# Patient Record
Sex: MALE | Race: WHITE | NOT HISPANIC OR LATINO | Employment: FULL TIME | URBAN - METROPOLITAN AREA
[De-identification: names, ages, dates, MRNs, and addresses within clinical notes are randomized per-mention and may not be internally consistent; named-entity substitution may affect disease eponyms.]

---

## 2020-09-09 DIAGNOSIS — R22.1 LOCALIZED SWELLING, MASS OR LUMP OF NECK: Primary | ICD-10-CM

## 2020-09-17 ENCOUNTER — HOSPITAL ENCOUNTER (OUTPATIENT)
Dept: ULTRASOUND IMAGING | Age: 25
Discharge: HOME | End: 2020-09-17

## 2020-09-17 DIAGNOSIS — R22.1 LOCALIZED SWELLING, MASS OR LUMP OF NECK: ICD-10-CM

## 2020-09-17 PROCEDURE — 76536 US EXAM OF HEAD AND NECK: CPT

## 2020-10-12 ENCOUNTER — HOSPITAL ENCOUNTER (OUTPATIENT)
Dept: CARDIOLOGY | Age: 25
Discharge: HOME | End: 2020-10-12

## 2020-10-12 ENCOUNTER — HOSPITAL ENCOUNTER (OUTPATIENT)
Dept: CARDIOLOGY | Age: 25
Discharge: HOME | End: 2020-10-12
Attending: FAMILY MEDICINE

## 2020-10-12 DIAGNOSIS — R07.9 RIGHT-SIDED CHEST PAIN: ICD-10-CM

## 2020-10-12 DIAGNOSIS — R94.31 ABNORMAL ELECTROCARDIOGRAM (ECG) (EKG): ICD-10-CM

## 2020-10-12 DIAGNOSIS — R94.31 ABNORMAL ELECTROCARDIOGRAM (ECG) (EKG): Primary | ICD-10-CM

## 2020-10-12 LAB
ATRIAL RATE (BPM): 63
P AXIS (DEGREES): 64
PR-INTERVAL (MSEC): 152
QRS-INTERVAL (MSEC): 94
QT-INTERVAL (MSEC): 388
QTC: 397
R AXIS (DEGREES): 84
REPORT TEXT: NORMAL
T AXIS (DEGREES): 14
VENTRICULAR RATE EKG/MIN (BPM): 63

## 2020-10-12 PROCEDURE — 93005 ELECTROCARDIOGRAM TRACING: CPT | Performed by: FAMILY MEDICINE

## 2021-03-27 ENCOUNTER — HOSPITAL ENCOUNTER (OUTPATIENT)
Dept: GENERAL RADIOLOGY | Age: 26
Discharge: HOME | End: 2021-03-27
Attending: PHYSICIAN ASSISTANT

## 2021-03-27 DIAGNOSIS — R22.9 LUMP OF SKIN: ICD-10-CM

## 2021-03-27 PROCEDURE — 71046 X-RAY EXAM CHEST 2 VIEWS: CPT

## 2022-01-20 ENCOUNTER — HOSPITAL ENCOUNTER (EMERGENCY)
Age: 27
Discharge: LEFT BEFORE DISCHARGE | End: 2022-01-20

## 2022-01-20 VITALS
HEART RATE: 88 BPM | TEMPERATURE: 98.2 F | DIASTOLIC BLOOD PRESSURE: 80 MMHG | OXYGEN SATURATION: 97 % | SYSTOLIC BLOOD PRESSURE: 136 MMHG | RESPIRATION RATE: 18 BRPM

## 2022-03-21 DIAGNOSIS — R22.9 LOCALIZED SUPERFICIAL SWELLING, MASS, OR LUMP: Primary | ICD-10-CM

## 2022-03-25 ENCOUNTER — APPOINTMENT (OUTPATIENT)
Dept: MRI IMAGING | Age: 27
End: 2022-03-25
Attending: PHYSICIAN ASSISTANT

## 2023-04-18 ENCOUNTER — TELEPHONE (OUTPATIENT)
Dept: FAMILY MEDICINE | Age: 28
End: 2023-04-18

## 2024-03-19 ENCOUNTER — OFFICE VISIT (OUTPATIENT)
Dept: INTERNAL MEDICINE | Facility: CLINIC | Age: 29
End: 2024-03-19
Payer: COMMERCIAL

## 2024-03-19 VITALS
WEIGHT: 271.8 LBS | DIASTOLIC BLOOD PRESSURE: 80 MMHG | HEART RATE: 68 BPM | BODY MASS INDEX: 38.05 KG/M2 | HEIGHT: 71 IN | OXYGEN SATURATION: 97 % | SYSTOLIC BLOOD PRESSURE: 125 MMHG

## 2024-03-19 DIAGNOSIS — F51.01 PRIMARY INSOMNIA: ICD-10-CM

## 2024-03-19 DIAGNOSIS — E55.9 VITAMIN D DEFICIENCY: ICD-10-CM

## 2024-03-19 DIAGNOSIS — F41.1 GAD (GENERALIZED ANXIETY DISORDER): ICD-10-CM

## 2024-03-19 DIAGNOSIS — Z00.00 ENCOUNTER FOR PREVENTIVE CARE: ICD-10-CM

## 2024-03-19 DIAGNOSIS — Z11.59 NEED FOR HEPATITIS C SCREENING TEST: ICD-10-CM

## 2024-03-19 DIAGNOSIS — Z11.4 SCREENING FOR HIV (HUMAN IMMUNODEFICIENCY VIRUS): Primary | ICD-10-CM

## 2024-03-19 PROCEDURE — 99214 OFFICE O/P EST MOD 30 MIN: CPT | Performed by: NURSE PRACTITIONER

## 2024-03-19 ASSESSMENT — ANXIETY QUESTIONNAIRES
1. FEELING NERVOUS, ANXIOUS, OR ON EDGE: SEVERAL DAYS
GAD7 TOTAL SCORE: 4
2. NOT BEING ABLE TO STOP OR CONTROL WORRYING: SEVERAL DAYS
6. BECOMING EASILY ANNOYED OR IRRITABLE: NOT AT ALL
3. WORRYING TOO MUCH ABOUT DIFFERENT THINGS: SEVERAL DAYS
GAD7 TOTAL SCORE: 4
IF YOU CHECKED OFF ANY PROBLEMS ON THIS QUESTIONNAIRE, HOW DIFFICULT HAVE THESE PROBLEMS MADE IT FOR YOU TO DO YOUR WORK, TAKE CARE OF THINGS AT HOME, OR GET ALONG WITH OTHER PEOPLE: NOT DIFFICULT AT ALL
7. FEELING AFRAID AS IF SOMETHING AWFUL MIGHT HAPPEN: NOT AT ALL
5. BEING SO RESTLESS THAT IT IS HARD TO SIT STILL: NOT AT ALL

## 2024-03-19 ASSESSMENT — PATIENT HEALTH QUESTIONNAIRE - PHQ9
SUM OF ALL RESPONSES TO PHQ QUESTIONS 1-9: 7
5. POOR APPETITE OR OVEREATING: SEVERAL DAYS

## 2024-03-19 NOTE — PATIENT INSTRUCTIONS
I usually comment on labs and imaging after they are all resulted within 2 business days. If you haven't heard your results within a week, please contact the office.    Thank you for coming in today!    If you receive a survey from [employer] about your experience today, it would be very helpful if you could fill it out to let us know what went well and what we can improve!    General Information:    Today you had your appointment with Carol Wheatley NP    My hours are:  Tuesdays : 2:00PM - 7:00 PM    I am not in the office the other weekdays. Non-urgent calls and medical messages on days I'm not in clinic are typically addressed twice weekly. Urgent matters will be reviewed and addressed by one of my partners in the office as needed.    If lab work was done today as part of your evaluation you will generally be contacted via [email or web address], mail, or phone with the results within 1-5 days. If there is an alarming result we will contact you by phone. Lab results come back at varying times. I generally wait until all lab results are available before making comments on the results.     If you are awaiting scheduling for a referral, please be sure to answer calls from [facility] or call the scheduling number.      If you need refills please contact your pharmacy. They will send a refill request to me to review. Please allow 3-5 business days for us to process all refill requests.     My Clinic RN is Ronda. Please call us at [phone] or send a medical message with any questions or concerns.     Sincerely, Carol Wheatley NP

## 2024-03-19 NOTE — PROGRESS NOTES
Assessment and Plan   Mr. Gray is a 28 year old male with history of anxiety who presents to establish care and to restart anxiety medication.      (Z00.00) Encounter for preventive care  (Z11.4) Screening for HIV (human immunodeficiency virus)  (primary encounter diagnosis)  (Z11.59) Need for hepatitis C screening test  Plan: REVIEW OF HEALTH MAINTENANCE PROTOCOL ORDERS,   HIV Screening,   Hepatitis C Screen Reflex to HCV RNA Quant and Genotype  Vitamin D Deficiency,   CBC with platelets and differential,   Comprehensive metabolic panel (BMP + Alb, Alk Phos, ALT, AST,       Total. Bili, TP),   Lipid panel reflex to direct LDL Fasting          (F41.1) QIAN (generalized anxiety disorder)  (F51.01) Primary insomnia  Comment: recurrent  Plan: FLUoxetine (PROZAC) 20 MG capsule,   Adult Mental Health  Referral,   TSH with free T4 reflex  Trial of melatonin 5 mg 1 hour before bed  Continue good lifestyle health practices of exercise, limited caffiene and alcohol use; Work to improve diet with fewer fast foods.       Health Maintenance:  Immunizations (covid, influenza, pneumovax, rsv, zoster, Tdap, Hep A/B):   Most Recent Immunizations   Administered Date(s) Administered    COVID-19 12+ (2023-24) (MODERNA) 11/14/2023     HTN:  Normotensive  Lipids:  Pending    Return to clinic/Follow-up:  As needed and with no improvement, worsening, or new symptom development.     __________________________    Subjective   Presenting Concern:  Establish care, Anxiety medication.      Recently moved back to the [place] after having been in [place] for the last several years.  No records available through [facility].     Health concerns include:    FH colorectal CA (father, age 60); Sister with noncancerous polyp age 24.    Anxiety, panic attacks, previously on Fluoxetine 20 mg daily. Started on 2020, took through 2023. Subsequent panic attack in February 2024 - tachycardia, some insomnia.    Open to counseling. No personal history of  trauma.   No suicidal/self harm thoughts; No guns in the home  Anxiety triggers include work, finances  Endorses good support through his girlfriend and his family    Cyst - Posterior right shoulder  Possible lipoma    Occ palpitations  Acid reflux    Past Medical History:  No past medical history on file.    Active Meds:  No current outpatient medications on file.     No current facility-administered medications for this visit.        Allergies:  Reviewed, refer to EMR    Family History:  Mother:  anxiety  Father: obesity, hypertension, dyslipidemia, colorectal cancer   Grandparents:  esophageal cancer     Relevant Social History:  Employment:  Pharmacy benefit analysis  Relationship/Children:  Lives in [place] with his girlfriend  Sexual Health:  No concerns  Diet/Nutrition:  Some fast foods  Supplements:  Vit D3, unsure of the amount  Caffeine:  1-2 coffees per week  Alcohol:  1 drink per week  Tobacco/Vaping:  None  Cannabis:  None  Street Drugs:  None  Sleep:  gets 6-8 hours  Exercise/Activity:  training for 1/2 marathon. Runs/walks 60 min, 3 days/week        ROS  General: POSITIVE for sleeping difficulty. NEGATIVE for change in weight, appetite.  ormal energy. No fever or chills, no excessive fatigue or daytime drowsiness  Eyes: NEGATIVE for vision changes, photophobia, redness or eye problems  ENT: NEGATIVE for problems with ears, nose or throat.  No difficulty swallowing.  Resp: NEGATIVE for cough, wheeze, shortness of breath, hemoptysis, denies apnea  CV: POSITIVE for occasional palpitations. NEGATIVE for chest pains,  tachycardia, chest pain, exertional chest pain or pressure, dyspnea on exertion, exercise intolerance  GI: POSITIVE for occasional GERD. NEGATIVE for poor appetite, dysphagia, nausea, vomiting, hematemesis, abdominal pain, diarrhea, constipation  : NEGATIVE for urinary frequency or dysuria, bladder or kidney problems  Musculoskeletal: NEGATIVE for significant muscle or joint pains, joint  "swelling, lower extremity edema, muscular weakness  Neurologic: NEGATIVE for headaches, dizziness, numbness, tingling, weakness   Endocrine: NEGATIVE for cold or heat intolerance, night sweats, polyuria, polydypsia, polyphagia  Hematologic: NEGATIVE for anemia, bleeding disorder, chills, fever, swollen nodes and weight loss  Skin: NEGATIVE for pigmentation, rash, bruising, concerning lesions or moles, hair changes  Psych: POSITIVE for anxiety & sleep problems.     Objective      /80 (BP Location: Right arm, Patient Position: Sitting, Cuff Size: Adult Large)   Pulse 68   Ht 1.8 m (5' 10.87\")   Wt 123.3 kg (271 lb 12.8 oz)   SpO2 97%   BMI 38.05 kg/m      Physical Exam   General appearance: alert, well appearing, and in no distress  Eyes: pupils equally round and reactive bilaterally, no scleral icterus  Neck: no significant adenopathy, lymphadenopathy, JVD  Respiratory: Good air movement bilaterally, lungs clear, no wheezes/rales/rhonchi, symmetric air entry and excursion  Cardiovascular: normal rate and regular rhythm, S1 and S2 normal, no murmurs, rubs or gallops, peripheral pulses full/symmetric, no peripheral edema  GI: Abdomen soft, bowel sounds present, nondistended, nontender, no organomegaly or masses, no rebound/guarding  Neurological: alert, oriented, normal speech, no focal findings   Extremities: peripheral pulses normal, no peripheral edema  Skin: normal coloration and turgor. No concerning lesions  Psychiatric: anxious affect, mentation    Options for treatment and follow-up care were reviewed with the patient. He engaged in the decision making process and verbalized understanding of the options discussed and agreed with the final plan.    I spent a total of 45minutes in the care of this pt today, including time prior to, during, and following today's office visit. This time includes reviewing the patient's chart and prior history, external notes, obtaining a history, performing an " examination, interpreting test results, and evaluation and counseling the patient. This time also includes ordering medications or tests necessary in addition to communication to other member's of the patient's health care team. Time spent in documentation and care coordination is included.    Carol Wheatley, ANP, AGACNP, APRN, CNP  Nurse Practitioner    Answers submitted by the patient for this visit:  General Questionnaire (Submitted on 3/19/2024)  Chief Complaint: Chronic problems general questions HPI Form  What is the reason for your visit today? : Yearly Physical + addressing couple questions regarding anxiety, moles, screening for colorectal cancer, and IBS  How many servings of fruits and vegetables do you eat daily?: 0-1  On average, how many sweetened beverages do you drink each day (Examples: soda, juice, sweet tea, etc.  Do NOT count diet or artificially sweetened beverages)?: 0  How many minutes a day do you exercise enough to make your heart beat faster?: 9 or less  How many days a week do you exercise enough to make your heart beat faster?: 4  How many days per week do you miss taking your medication?: 7  What makes it hard for you to take your medication every day?: remembering to take        3/26/2024 Addendum:  Vit D Deficiency:  Vit D3 level 16.  Advised supplement of 1500 units/day.  Will recheck level in 6 months.  TY [place]

## 2024-03-25 ENCOUNTER — VIRTUAL VISIT (OUTPATIENT)
Dept: PSYCHOLOGY | Facility: CLINIC | Age: 29
End: 2024-03-25
Attending: NURSE PRACTITIONER
Payer: COMMERCIAL

## 2024-03-25 ENCOUNTER — LAB (OUTPATIENT)
Dept: LAB | Facility: CLINIC | Age: 29
End: 2024-03-25
Payer: COMMERCIAL

## 2024-03-25 DIAGNOSIS — F41.1 GAD (GENERALIZED ANXIETY DISORDER): ICD-10-CM

## 2024-03-25 DIAGNOSIS — Z11.4 SCREENING FOR HIV (HUMAN IMMUNODEFICIENCY VIRUS): ICD-10-CM

## 2024-03-25 DIAGNOSIS — Z00.00 ENCOUNTER FOR PREVENTIVE CARE: ICD-10-CM

## 2024-03-25 DIAGNOSIS — Z11.59 NEED FOR HEPATITIS C SCREENING TEST: ICD-10-CM

## 2024-03-25 LAB
ALBUMIN SERPL BCG-MCNC: 4.7 G/DL (ref 3.5–5.2)
ALP SERPL-CCNC: 90 U/L (ref 40–150)
ALT SERPL W P-5'-P-CCNC: 40 U/L (ref 0–70)
ANION GAP SERPL CALCULATED.3IONS-SCNC: 10 MMOL/L (ref 7–15)
AST SERPL W P-5'-P-CCNC: 22 U/L (ref 0–45)
BASOPHILS # BLD AUTO: 0.1 10E3/UL (ref 0–0.2)
BASOPHILS NFR BLD AUTO: 1 %
BILIRUB SERPL-MCNC: 0.6 MG/DL
BUN SERPL-MCNC: 11.7 MG/DL (ref 6–20)
CALCIUM SERPL-MCNC: 9.3 MG/DL (ref 8.6–10)
CHLORIDE SERPL-SCNC: 103 MMOL/L (ref 98–107)
CHOLEST SERPL-MCNC: 132 MG/DL
CREAT SERPL-MCNC: 1.03 MG/DL (ref 0.67–1.17)
DEPRECATED HCO3 PLAS-SCNC: 27 MMOL/L (ref 22–29)
EGFRCR SERPLBLD CKD-EPI 2021: >90 ML/MIN/1.73M2
EOSINOPHIL # BLD AUTO: 0.1 10E3/UL (ref 0–0.7)
EOSINOPHIL NFR BLD AUTO: 2 %
ERYTHROCYTE [DISTWIDTH] IN BLOOD BY AUTOMATED COUNT: 12.4 % (ref 10–15)
FASTING STATUS PATIENT QL REPORTED: NORMAL
GLUCOSE SERPL-MCNC: 86 MG/DL (ref 70–99)
HCT VFR BLD AUTO: 47.8 % (ref 40–53)
HCV AB SERPL QL IA: NONREACTIVE
HDLC SERPL-MCNC: 42 MG/DL
HGB BLD-MCNC: 16.1 G/DL (ref 13.3–17.7)
HIV 1+2 AB+HIV1 P24 AG SERPL QL IA: NONREACTIVE
IMM GRANULOCYTES # BLD: 0 10E3/UL
IMM GRANULOCYTES NFR BLD: 0 %
LDLC SERPL CALC-MCNC: 78 MG/DL
LYMPHOCYTES # BLD AUTO: 1.9 10E3/UL (ref 0.8–5.3)
LYMPHOCYTES NFR BLD AUTO: 38 %
MCH RBC QN AUTO: 29.4 PG (ref 26.5–33)
MCHC RBC AUTO-ENTMCNC: 33.7 G/DL (ref 31.5–36.5)
MCV RBC AUTO: 87 FL (ref 78–100)
MONOCYTES # BLD AUTO: 0.5 10E3/UL (ref 0–1.3)
MONOCYTES NFR BLD AUTO: 9 %
NEUTROPHILS # BLD AUTO: 2.4 10E3/UL (ref 1.6–8.3)
NEUTROPHILS NFR BLD AUTO: 50 %
NONHDLC SERPL-MCNC: 90 MG/DL
NRBC # BLD AUTO: 0 10E3/UL
NRBC BLD AUTO-RTO: 0 /100
PLATELET # BLD AUTO: 217 10E3/UL (ref 150–450)
POTASSIUM SERPL-SCNC: 4.2 MMOL/L (ref 3.4–5.3)
PROT SERPL-MCNC: 7.5 G/DL (ref 6.4–8.3)
RBC # BLD AUTO: 5.48 10E6/UL (ref 4.4–5.9)
SODIUM SERPL-SCNC: 140 MMOL/L (ref 135–145)
TRIGL SERPL-MCNC: 61 MG/DL
TSH SERPL DL<=0.005 MIU/L-ACNC: 2.14 UIU/ML (ref 0.3–4.2)
VIT D+METAB SERPL-MCNC: 16 NG/ML (ref 20–50)
WBC # BLD AUTO: 4.9 10E3/UL (ref 4–11)

## 2024-03-25 PROCEDURE — 82306 VITAMIN D 25 HYDROXY: CPT | Performed by: NURSE PRACTITIONER

## 2024-03-25 PROCEDURE — 80061 LIPID PANEL: CPT | Performed by: PATHOLOGY

## 2024-03-25 PROCEDURE — 36415 COLL VENOUS BLD VENIPUNCTURE: CPT | Performed by: PATHOLOGY

## 2024-03-25 PROCEDURE — 90791 PSYCH DIAGNOSTIC EVALUATION: CPT | Mod: 95

## 2024-03-25 PROCEDURE — 87389 HIV-1 AG W/HIV-1&-2 AB AG IA: CPT | Performed by: NURSE PRACTITIONER

## 2024-03-25 PROCEDURE — 86803 HEPATITIS C AB TEST: CPT | Performed by: NURSE PRACTITIONER

## 2024-03-25 PROCEDURE — 85025 COMPLETE CBC W/AUTO DIFF WBC: CPT | Performed by: PATHOLOGY

## 2024-03-25 PROCEDURE — 99000 SPECIMEN HANDLING OFFICE-LAB: CPT | Performed by: PATHOLOGY

## 2024-03-25 PROCEDURE — 84443 ASSAY THYROID STIM HORMONE: CPT | Performed by: PATHOLOGY

## 2024-03-25 PROCEDURE — 80053 COMPREHEN METABOLIC PANEL: CPT | Performed by: PATHOLOGY

## 2024-03-25 ASSESSMENT — COLUMBIA-SUICIDE SEVERITY RATING SCALE - C-SSRS
TOTAL  NUMBER OF ABORTED OR SELF INTERRUPTED ATTEMPTS LIFETIME: NO
4. HAVE YOU HAD THESE THOUGHTS AND HAD SOME INTENTION OF ACTING ON THEM?: NO
3. HAVE YOU BEEN THINKING ABOUT HOW YOU MIGHT KILL YOURSELF?: NO
5. HAVE YOU STARTED TO WORK OUT OR WORKED OUT THE DETAILS OF HOW TO KILL YOURSELF? DO YOU INTEND TO CARRY OUT THIS PLAN?: NO
2. HAVE YOU ACTUALLY HAD ANY THOUGHTS OF KILLING YOURSELF?: NO
2. HAVE YOU ACTUALLY HAD ANY THOUGHTS OF KILLING YOURSELF?: YES
TOTAL  NUMBER OF INTERRUPTED ATTEMPTS LIFETIME: NO
1. HAVE YOU WISHED YOU WERE DEAD OR WISHED YOU COULD GO TO SLEEP AND NOT WAKE UP?: NO
6. HAVE YOU EVER DONE ANYTHING, STARTED TO DO ANYTHING, OR PREPARED TO DO ANYTHING TO END YOUR LIFE?: NO
ATTEMPT LIFETIME: NO

## 2024-03-25 ASSESSMENT — PATIENT HEALTH QUESTIONNAIRE - PHQ9
10. IF YOU CHECKED OFF ANY PROBLEMS, HOW DIFFICULT HAVE THESE PROBLEMS MADE IT FOR YOU TO DO YOUR WORK, TAKE CARE OF THINGS AT HOME, OR GET ALONG WITH OTHER PEOPLE: SOMEWHAT DIFFICULT
SUM OF ALL RESPONSES TO PHQ QUESTIONS 1-9: 6
SUM OF ALL RESPONSES TO PHQ QUESTIONS 1-9: 6

## 2024-03-25 NOTE — PROGRESS NOTES
"[facility]      PATIENT'S NAME: Nghia Gray  PREFERRED NAME: Nghia  PRONOUNS:      he him his  MRN: [id number]  : 1995  ADDRESS: [address] [place] [address]  ACCT. NUMBER:  [id number]  DATE OF SERVICE: 3/25/24  START TIME: 730  END TIME: 815  PREFERRED PHONE: [phone]  May we leave a program related message: Yes  EMERGENCY CONTACT: was obtained Partner Yanique AlcalaOidiu-[phone] .  SERVICE MODALITY:  Video Visit:      Provider verified identity through the following two step process.  Patient provided:  Patient  and Patient address    Telemedicine Visit: The patient's condition can be safely assessed and treated via synchronous audio and visual telemedicine encounter.      Reason for Telemedicine Visit: Patient has requested telehealth visit    Originating Site (Patient Location): Patient's home    Distant Site (Provider Location): Provider Remote Setting- Home Office    Consent:  The patient/guardian has verbally consented to: the potential risks and benefits of telemedicine (video visit) versus in person care; bill my insurance or make self-payment for services provided; and responsibility for payment of non-covered services.     Patient would like the video invitation sent by:  My Chart    Mode of Communication:  Video Conference via [facility]well    Distant Location (Provider):  Off-site    As the provider I attest to compliance with applicable laws and regulations related to telemedicine.    UNIVERSAL ADULT Mental Health DIAGNOSTIC ASSESSMENT    Identifying Information:  Patient is a 28 year old,   individual.  Patient was referred for an assessment by referring provider.  Patient attended the session .    Chief Complaint:   The reason for seeking services at this time is: \"Family emotional trauma coupled with long-term relationship and working through the new issues it presents\".  The problem(s) began 25.    Patient has attempted to resolve these " concerns in the past through therapy-has had three therapists, did not feel they were great fits for him .    Social/Family History:  Patient reported they grew up in [place], [place].  They were raised by biological parents  .  Parents were always together.  Patient reported that their childhood was good as far as he can recall.  Mother stayed home with the children.  Father worked full time, didn't see him from morning till night.  Tenuous relationships with siblings, grew out of that as they matured.  His mother was a very anxious person who didn't take care of her anxiety or go to therapy for this.  This affected pt and his sister in different ways.  Patient described their current relationships with family of origin as decent overall.     The patient describes their cultural background as .  Cultural influences and impact on patient's life structure, values, norms, and healthcare: Parents raised us Jew, I  from the Buddhist as soon as I went to college.  Contextual influences on patient's health include: Contextual Factors: Individual Factors symptoms of anxiety, boundary concerns and relationship navigation .    These factors will be addressed in the Preliminary Treatment plan. Patient identified their preferred language to be English. Patient reported they does not need the assistance of an  or other support involved in therapy.     Patient reported had no significant delays in developmental tasks.   Patient's highest education level was college graduate  .  Patient identified the following learning problems: none reported.  Modifications will not be used to assist communication in therapy.  Patient reports they are  able to understand written materials.    Patient's current relationship status is has a partner or significant other.   Patient identified their sexual orientation as heterosexual.  Patient reported having   0 child(pérez). Patient identified partner as part of  their support system.  Patient identified the quality of these relationships as inconsistent,  .      Patient's current living/housing situation involves staying with someone.  The immediate members of family and household include Yanique, Christian,Partner and they report that housing is stable.    Patient is currently employed fulltime.  Patient reports their finances are obtained through employment; parents. Patient does identify finances as a current stressor.      Patient reported that they have not been involved with the legal system.  . Patient does not report being under probation/ parole/ jurisdiction. They are not under any current court jurisdiction. .    Patient's Strengths and Limitations:  Patient identified the following strengths or resources that will help them succeed in treatment:  stable relationship who is supportive, good friend support . Things that may interfere with the patient's success in treatment include:  inability to unpack the familial concerns .     Assessments:  The following assessments were completed by patient for this visit:  PHQ9:       3/19/2024     5:17 PM 3/25/2024     8:01 AM   PHQ-9 SCORE   PHQ-9 Total Score MyChart  6 (Mild depression)   PHQ-9 Total Score 7 6     GAD7:       3/19/2024     5:17 PM   QIAN-7 SCORE   Total Score 4     CAGE-AID:       3/25/2024     8:15 AM   CAGE-AID Total Score   Total Score 0   Total Score MyChart 0 (A total score of 2 or greater is considered clinically significant)     PROMIS 10-Global Health (all questions and answers displayed):       3/25/2024     8:14 AM   PROMIS 10   In general, would you say your health is: Fair   In general, would you say your quality of life is: Good   In general, how would you rate your physical health? Fair   In general, how would you rate your mental health, including your mood and your ability to think? Good   In general, how would you rate your satisfaction with your social activities and relationships? Good   In  general, please rate how well you carry out your usual social activities and roles Very good   To what extent are you able to carry out your everyday physical activities such as walking, climbing stairs, carrying groceries, or moving a chair? Completely   In the past 7 days, how often have you been bothered by emotional problems such as feeling anxious, depressed, or irritable? Sometimes   In the past 7 days, how would you rate your fatigue on average? Moderate   In the past 7 days, how would you rate your pain on average, where 0 means no pain, and 10 means worst imaginable pain? 3   In general, would you say your health is: 2   In general, would you say your quality of life is: 3   In general, how would you rate your physical health? 2   In general, how would you rate your mental health, including your mood and your ability to think? 3   In general, how would you rate your satisfaction with your social activities and relationships? 3   In general, please rate how well you carry out your usual social activities and roles. (This includes activities at home, at work and in your community, and responsibilities as a parent, child, spouse, employee, friend, etc.) 4   To what extent are you able to carry out your everyday physical activities such as walking, climbing stairs, carrying groceries, or moving a chair? 5   In the past 7 days, how often have you been bothered by emotional problems such as feeling anxious, depressed, or irritable? 3   In the past 7 days, how would you rate your fatigue on average? 3   In the past 7 days, how would you rate your pain on average, where 0 means no pain, and 10 means worst imaginable pain? 3   Global Mental Health Score 12   Global Physical Health Score 14   PROMIS TOTAL - SUBSCORES 26     [place] Suicide Severity Rating Scale (Lifetime/Recent)      3/25/2024     8:00 AM   [place] Suicide Severity Rating (Lifetime/Recent)   Q1 Wish to be Dead (Lifetime) N   Q2 Non-Specific Active  Suicidal Thoughts (Lifetime) Y   Non-Specific Active Suicidal Thought Description (Lifetime) [id number] in high school-friend issues at school.   2. Non-Specific Active Suicidal Thoughts (Past 1 Month) N   3. Active Suicidal Ideation with any Methods (Not Plan) Without Intent to Act (Lifetime) N   Q4 Active Suicidal Ideation with Some Intent to Act, Without Specific Plan (Lifetime) N   Q5 Active Suicidal Ideation with Specific Plan and Intent (Lifetime) N   Actual Attempt (Lifetime) N   Has subject engaged in non-suicidal self-injurious behavior? (Lifetime) N   Interrupted Attempts (Lifetime) N   Aborted or Self-Interrupted Attempt (Lifetime) N   Preparatory Acts or Behavior (Lifetime) N   Calculated C-SSRS Risk Score (Lifetime/Recent) No Risk Indicated       Personal and Family Medical History:  Patient does report a family history of mental health concerns.  Patient reports family history includes Anxiety Disorder in his maternal grandmother, mother, and sister; Colon Cancer in his father; Obesity in his paternal grandmother..     Patient does report Mental Health Diagnosis and/or Treatment.  Patient reported the following previous diagnoses which include(s): an anxiety disorder .  Patient reported symptoms began in high school as a Rick.  Patient has received mental health services in the past:  therapy  .  Psychiatric Hospitalizations: none .  Patient denies a history of civil commitment.      Currently, patient none  is receiving other mental health services.  These include none.       Patient has had a physical exam to rule out medical causes for current symptoms.  Date of last physical exam was within the past year. Client was encouraged to follow up with PCP if symptoms were to develop. The patient has a [facility] Primary Care Provider, who is named matt Wheatley Patient reports no current medical and/or dental concerns.  Patient denies any issues with pain..   There are not significant appetite /  nutritional concerns / weight changes.   Patient does report a history of head injury / trauma / cognitive impairment. In 7th grade large picture frame fell on him had stitches, and concussion like symptoms.     Patient reports current meds as:   Outpatient Medications Marked as Taking for the 3/25/24 encounter (Virtual Visit) with Freida Patino [facility]   Medication Sig    FLUoxetine (PROZAC) 20 MG capsule Take 1 capsule (20 mg) by mouth daily       Medication Adherence:  Patient reports taking.  taking prescribed medications as prescribed.    Patient Allergies:    Allergies   Allergen Reactions    Seasonal Allergies        Medical History:    Past Medical History:   Diagnosis Date    Depressive disorder 2013         Current Mental Status Exam:   Appearance:  Appropriate    Eye Contact:  Good   Psychomotor:  Normal       Gait / station:  Not assessed  Attitude / Demeanor: Cooperative  Pleasant  Speech      Rate / Production: Normal/ Responsive      Volume:  Normal  volume      Language:  no problems  Mood:   Normal  Affect:   Appropriate    Thought Content: Clear   Thought Process: Coherent  Logical       Associations: No loosening of associations  Insight:   Good   Judgment:  Intact   Orientation:  All  Attention/concentration: Good    Substance Use:   Patient did report a family history of substance use concerns on mothers side-alcohol. Patient has not received chemical dependency treatment in the past.  Patient has not ever been to detox.      Patient is not currently receiving any chemical dependency treatment.           Substance History of use Age of first use Date of last use     Pattern and duration of use (include amounts and frequency)   Alcohol currently use   19 03/24/24 Reports a dink or two a week, socially only.   Cannabis   used in the past 22 04/22/18 REPORTS SUBSTANCE USE: N/A     Amphetamines   never used     REPORTS SUBSTANCE USE: N/A   Cocaine/crack    never used       REPORTS SUBSTANCE USE:  N/A   Hallucinogens never used         REPORTS SUBSTANCE USE: N/A   Inhalants never used         REPORTS SUBSTANCE USE: N/A   Heroin never used         REPORTS SUBSTANCE USE: N/A   Other Opiates never used     REPORTS SUBSTANCE USE: N/A   Benzodiazepine   never used     REPORTS SUBSTANCE USE: N/A   Barbiturates never used     REPORTS SUBSTANCE USE: N/A   Over the counter meds never used     REPORTS SUBSTANCE USE: N/A   Caffeine currently use 9   One or two cups of coffee a week.   Nicotine  never used     REPORTS SUBSTANCE USE: N/A   Other substances not listed above:  Identify:  never used     REPORTS SUBSTANCE USE: N/A     Patient reported the following problems as a result of their substance use: no problems, not applicable.    Substance Use: No symptoms    Based on the negative CAGE score and clinical interview there  are not indications of drug or alcohol abuse.    Significant Losses / Trauma / Abuse / Neglect Issues:   Patient did not  serve in the .  There are indications or report of significant loss, trauma, abuse or neglect issues related to:  emotional trauma from family of origin .  Concerns for possible neglect are not present.     Safety Assessment:   Patient denies current homicidal ideation and behaviors.  Patient denies current self-injurious ideation and behaviors.    Patient denied risk behaviors associated with substance use.   Patient denies any high risk behaviors associated with mental health symptoms.  Patient reports the following current concerns for their personal safety: None.  Patient reports there are not firearms in the house.       There are no firearms in the home..    History of Safety Concerns:  Patient denied a history of homicidal ideation.     Patient denied a history of personal safety concerns.    Patient denied a history of assaultive behaviors.    Patient denied a history of sexual assault behaviors.     Patient denied a history of risk behaviors associated with  substance use.  Patient denies any history of high risk behaviors associated with mental health symptoms.  Patient reports the following protective factors: forward or future oriented thinking; dedication to family or friends; safe and stable environment; regular physical activity; sense of belonging; secure attachment; adherence with prescribed medication; living with other people; commitment to well being    Risk Plan:  See Recommendations for Safety and Risk Management Plan    Review of Symptoms per patient report:   Depression: Change in sleep, Difficulties concentrating, and Low self-worth  Analilia:  No Symptoms  Psychosis: No Symptoms  Anxiety: Excessive worry, Nervousness, Sleep disturbance, Ruminations, and Poor concentration  Panic:  No symptoms  Post Traumatic Stress Disorder:  No Symptoms   Eating Disorder: No Symptoms  ADD / ADHD:  No symptoms  Conduct Disorder: No symptoms  Autism Spectrum Disorder: No symptoms  Obsessive Compulsive Disorder: No Symptoms    Patient reports the following compulsive behaviors and treatment history:  NA .      Diagnostic Criteria:   Generalized Anxiety Disorder  A. Excessive anxiety and worry about a number of events or activities (such as work or school performance).   B. The person finds it difficult to control the worry.  C. Select 3 or more symptoms (required for diagnosis). Only one item is required in children.   - Being easily fatigued.    - Difficulty concentrating or mind going blank.    - Sleep disturbance (difficulty falling or staying asleep, or restless unsatisfying sleep).   D. The focus of the anxiety and worry is not confined to features of an Axis I disorder.  E. The anxiety, worry, or physical symptoms cause clinically significant distress or impairment in social, occupational, or other important areas of functioning.   F. The disturbance is not due to the direct physiological effects of a substance (e.g., a drug of abuse, a medication) or a general medical  condition (e.g., hyperthyroidism) and does not occur exclusively during a Mood Disorder, a Psychotic Disorder, or a Pervasive Developmental Disorder.    - The aformentioned symptoms began several year(s) ago and occurs 7 days per week and is experienced as mild.    Functional Status:  Patient reports the following functional impairments:  relationship(s).     Nonprogrammatic care:  Patient is requesting basic services to address current mental health concerns.    Clinical Summary:  1. Psychosocial, Cultural and Contextual Factors: lives with partner  .  2. Principal DSM5 Diagnoses  (Sustained by DSM5 Criteria Listed Above):   300.02 (F41.1) Generalized Anxiety Disorder.  3. Other Diagnoses that is relevant to services:   NA.  4. Provisional Diagnosis:  NA.  5. Prognosis: Expect Improvement and Maintain Current Status / Prevent Deterioration.  6. Likely consequences of symptoms if not treated: worsening symptoms, increase in functional impairment.  7. Client strengths include:  educated, employed, goal-focused, good listener, has a previous history of therapy, insightful, intelligent, motivated, open to learning, open to suggestions / feedback, support of family, friends and providers, supportive, wants to learn, and willing to ask questions .     Recommendations:     1. Plan for Safety and Risk Management:   Safety and Risk: Recommended that patient call 911 or go to the local ED should there be a change in any of these risk factors..          Report to child / adult protection services was NA.     2. Patient's identified  no health or cultural concerns .     3. Initial Treatment will focus on:    Anxiety - QIAN  Functional Impairment at: relationships .     4. Resources/Service Plan:    services are not indicated.   Modifications to assist communication are not indicated.   Additional disability accommodations are not indicated.      5. Collaboration:   Collaboration / coordination of treatment will be  initiated with the following  support professionals: primary care physician.      6.  Referrals:   The following referral(s) will be initiated:  NA .       A Release of Information has been obtained for the following:  NA .     Clinical Substantiation/medical necessity for the above recommendations:  individual therapy is necessary in order to alleviate current symptoms and restore relationship functioning.    7. ANTONI:    ANTONI:  Not identified.    8. Records:   These were reviewed at time of assessment.   Information in this assessment was obtained from the medical record and  provided by patient who is a good historian.    Patient will have open access to their mental health medical record.    9.   Interactive Complexity: No    10. Safety Plan:   Patient noted brief suicidal ideation without intent, or plan when he was in high school-which was related to friend issues.  None since that time (2012).       Provider Name/ Credentials:  Freida Patino MS, [facility]  March 25, 2024

## 2024-04-11 ENCOUNTER — VIRTUAL VISIT (OUTPATIENT)
Dept: PSYCHOLOGY | Facility: CLINIC | Age: 29
End: 2024-04-11
Payer: COMMERCIAL

## 2024-04-11 DIAGNOSIS — F41.1 GAD (GENERALIZED ANXIETY DISORDER): Primary | ICD-10-CM

## 2024-04-11 PROCEDURE — 90834 PSYTX W PT 45 MINUTES: CPT | Mod: 95

## 2024-04-11 NOTE — PROGRESS NOTES
Individual Treatment Plan    Patient's Name: Nghia Gray  YOB: 1995    Date of Creation: 4/11/2024  Date Treatment Plan Last Reviewed/Revised: 4/11/2024    DSM5 Diagnoses: 300.02 (F41.1) Generalized Anxiety Disorder  Psychosocial / Contextual Factors: lives in own home/apt with significant other, employed fulltime  PROMIS (reviewed every 90 days): 3/25/24    Referral / Collaboration:  Referral to another professional/service is not indicated at this time.    Anticipated number of session for this episode of care: 9-12 sessions  Anticipation frequency of session: Every other week  Anticipated Duration of each session: 38-52 minutes  Treatment plan will be reviewed in 90 days or when goals have been changed.       MeasurableTreatment Goal(s) related to diagnosis / functional impairment(s)  Goal 1: Patient will decrease anxiety.    I will know I've met my goal when I feel no anxiety or stress about what my family thinks of me.      Objective #A (Patient Action)    Patient will compile a list of boundaries that they would like to set with others. Family and mother .  Status: New - Date: 4/11/2024      Intervention(s)  Therapist will teach about healthy boundaries.   .    Objective #B  Patient will learn & utilize at least 1 assertive communication skills weekly.  Status: New - Date: 4/11/2024      Intervention(s)  Therapist will teach assertiveness skills.   .    Objective #C  Patient will Identify negative self-talk and behaviors: challenge core beliefs, myths, and actions.  Status: New - Date: 4/11/2024      Intervention(s)  Therapist will provide psychoeducation, behavioral activation, and cognitive restructuring..    Objective #B  Patient will  be able to self-validate at times he feels he has made a mistake of some kind .    Status: New - Date: 4/11/2024      Intervention(s)  Therapist will provide psychoeducation, behavioral activation, and cognitive  "restructuring..    Patient has reviewed and agreed to the above plan.      Freida Sukumar, [facility]  2024    [facility]      PATIENT'S NAME: Nghia Gray  PREFERRED NAME: Nghia  PRONOUNS:      he him his  MRN: [id number]  : 1995  ADDRESS: [address] [place] [address]  ACCT. NUMBER:  [id number]  DATE OF SERVICE: 24  START TIME: 730  END TIME: 815  PREFERRED PHONE: [phone]  May we leave a program related message: Yes  EMERGENCY CONTACT: was obtained Partner Yanique AlcalaYhwcy-[phone] .  SERVICE MODALITY:  Video Visit:      Provider verified identity through the following two step process.  Patient provided:  Patient  and Patient address    Telemedicine Visit: The patient's condition can be safely assessed and treated via synchronous audio and visual telemedicine encounter.      Reason for Telemedicine Visit: Patient has requested telehealth visit    Originating Site (Patient Location): Patient's home    Distant Site (Provider Location): Provider Remote Setting- Home Office    Consent:  The patient/guardian has verbally consented to: the potential risks and benefits of telemedicine (video visit) versus in person care; bill my insurance or make self-payment for services provided; and responsibility for payment of non-covered services.     Patient would like the video invitation sent by:  My Chart    Mode of Communication:  Video Conference via [facility]    Distant Location (Provider):  Off-site    As the provider I attest to compliance with applicable laws and regulations related to telemedicine.    UNIVERSAL ADULT Mental Health DIAGNOSTIC ASSESSMENT    Identifying Information:  Patient is a 28 year old,   individual.  Patient was referred for an assessment by referring provider.  Patient attended the session .    Chief Complaint:   The reason for seeking services at this time is: \"Family emotional trauma coupled with long-term relationship and working " "through the new issues it presents\".  The problem(s) began 03/25/24.    Patient has attempted to resolve these concerns in the past through therapy-has had three therapists, did not feel they were great fits for him .    Social/Family History:  Patient reported they grew up in [place], [place].  They were raised by biological parents  .  Parents were always together.  Patient reported that their childhood was good as far as he can recall.  Mother stayed home with the children.  Father worked full time, didn't see him from morning till night.  Tenuous relationships with siblings, grew out of that as they matured.  His mother was a very anxious person who didn't take care of her anxiety or go to therapy for this.  This affected pt and his sister in different ways.  Patient described their current relationships with family of origin as decent overall.     The patient describes their cultural background as .  Cultural influences and impact on patient's life structure, values, norms, and healthcare: Parents raised us [employer], I  from the [employer] as soon as I went to college.  Contextual influences on patient's health include: Contextual Factors: Individual Factors symptoms of anxiety, boundary concerns and relationship navigation .    These factors will be addressed in the Preliminary Treatment plan. Patient identified their preferred language to be English. Patient reported they does not need the assistance of an  or other support involved in therapy.     Patient reported had no significant delays in developmental tasks.   Patient's highest education level was college graduate  .  Patient identified the following learning problems: none reported.  Modifications will not be used to assist communication in therapy.  Patient reports they are  able to understand written materials.    Patient's current relationship status is has a partner or significant other.   Patient identified their " sexual orientation as heterosexual.  Patient reported having   0 child(pérez). Patient identified partner as part of their support system.  Patient identified the quality of these relationships as inconsistent,  .      Patient's current living/housing situation involves staying with someone.  The immediate members of family and household include Christian Chanel,Partner and they report that housing is stable.    Patient is currently employed fulltime.  Patient reports their finances are obtained through employment; parents. Patient does identify finances as a current stressor.      Patient reported that they have not been involved with the legal system.  . Patient does not report being under probation/ parole/ jurisdiction. They are not under any current court jurisdiction. .    Patient's Strengths and Limitations:  Patient identified the following strengths or resources that will help them succeed in treatment:  stable relationship who is supportive, good friend support . Things that may interfere with the patient's success in treatment include:  inability to unpack the familial concerns .     Assessments:  The following assessments were completed by patient for this visit:  PHQ9:       3/19/2024     5:17 PM 3/25/2024     8:01 AM   PHQ-9 SCORE   PHQ-9 Total Score MyChart  6 (Mild depression)   PHQ-9 Total Score 7 6     GAD7:       3/19/2024     5:17 PM   QIAN-7 SCORE   Total Score 4     CAGE-AID:       3/25/2024     8:15 AM   CAGE-AID Total Score   Total Score 0   Total Score MyChart 0 (A total score of 2 or greater is considered clinically significant)     PROMIS 10-Global Health (all questions and answers displayed):       3/25/2024     8:14 AM   PROMIS 10   In general, would you say your health is: Fair   In general, would you say your quality of life is: Good   In general, how would you rate your physical health? Fair   In general, how would you rate your mental health, including your mood and your ability to think?  Good   In general, how would you rate your satisfaction with your social activities and relationships? Good   In general, please rate how well you carry out your usual social activities and roles Very good   To what extent are you able to carry out your everyday physical activities such as walking, climbing stairs, carrying groceries, or moving a chair? Completely   In the past 7 days, how often have you been bothered by emotional problems such as feeling anxious, depressed, or irritable? Sometimes   In the past 7 days, how would you rate your fatigue on average? Moderate   In the past 7 days, how would you rate your pain on average, where 0 means no pain, and 10 means worst imaginable pain? 3   In general, would you say your health is: 2   In general, would you say your quality of life is: 3   In general, how would you rate your physical health? 2   In general, how would you rate your mental health, including your mood and your ability to think? 3   In general, how would you rate your satisfaction with your social activities and relationships? 3   In general, please rate how well you carry out your usual social activities and roles. (This includes activities at home, at work and in your community, and responsibilities as a parent, child, spouse, employee, friend, etc.) 4   To what extent are you able to carry out your everyday physical activities such as walking, climbing stairs, carrying groceries, or moving a chair? 5   In the past 7 days, how often have you been bothered by emotional problems such as feeling anxious, depressed, or irritable? 3   In the past 7 days, how would you rate your fatigue on average? 3   In the past 7 days, how would you rate your pain on average, where 0 means no pain, and 10 means worst imaginable pain? 3   Global Mental Health Score 12   Global Physical Health Score 14   PROMIS TOTAL - SUBSCORES 26     [place] Suicide Severity Rating Scale (Lifetime/Recent)      3/25/2024     8:00 AM    [place] Suicide Severity Rating (Lifetime/Recent)   Q1 Wish to be Dead (Lifetime) N   Q2 Non-Specific Active Suicidal Thoughts (Lifetime) Y   Non-Specific Active Suicidal Thought Description (Lifetime) 6026-9204 in high school-friend issues at school.   2. Non-Specific Active Suicidal Thoughts (Past 1 Month) N   3. Active Suicidal Ideation with any Methods (Not Plan) Without Intent to Act (Lifetime) N   Q4 Active Suicidal Ideation with Some Intent to Act, Without Specific Plan (Lifetime) N   Q5 Active Suicidal Ideation with Specific Plan and Intent (Lifetime) N   Actual Attempt (Lifetime) N   Has subject engaged in non-suicidal self-injurious behavior? (Lifetime) N   Interrupted Attempts (Lifetime) N   Aborted or Self-Interrupted Attempt (Lifetime) N   Preparatory Acts or Behavior (Lifetime) N   Calculated C-SSRS Risk Score (Lifetime/Recent) No Risk Indicated       Personal and Family Medical History:  Patient does report a family history of mental health concerns.  Patient reports family history includes Anxiety Disorder in his maternal grandmother, mother, and sister; Colon Cancer in his father; Obesity in his paternal grandmother..     Patient does report Mental Health Diagnosis and/or Treatment.  Patient reported the following previous diagnoses which include(s): an anxiety disorder .  Patient reported symptoms began in high school as a Rick.  Patient has received mental health services in the past:  therapy  .  Psychiatric Hospitalizations: none .  Patient denies a history of civil commitment.      Currently, patient none  is receiving other mental health services.  These include none.       Patient has had a physical exam to rule out medical causes for current symptoms.  Date of last physical exam was within the past year. Client was encouraged to follow up with PCP if symptoms were to develop. The patient has a [facility] Provider, who is named matt Wheatley Patient reports no current medical  and/or dental concerns.  Patient denies any issues with pain..   There are not significant appetite / nutritional concerns / weight changes.   Patient does report a history of head injury / trauma / cognitive impairment. In 7th grade large picture frame fell on him had stitches, and concussion like symptoms.     Patient reports current meds as:   Current Outpatient Medications   Medication Sig Dispense Refill    FLUoxetine (PROZAC) 20 MG capsule Take 1 capsule (20 mg) by mouth daily 90 capsule 2     No current facility-administered medications for this visit.       Medication Adherence:  Patient reports taking.  taking prescribed medications as prescribed.    Patient Allergies:    Allergies   Allergen Reactions    Seasonal Allergies        Medical History:    Past Medical History:   Diagnosis Date    Depressive disorder 2013         Current Mental Status Exam:   Appearance:  Appropriate    Eye Contact:  Good   Psychomotor:  Normal       Gait / station:  Not assessed  Attitude / Demeanor: Cooperative  Pleasant  Speech      Rate / Production: Normal/ Responsive      Volume:  Normal  volume      Language:  no problems  Mood:   Normal  Affect:   Appropriate    Thought Content: Clear   Thought Process: Coherent  Logical       Associations: No loosening of associations  Insight:   Good   Judgment:  Intact   Orientation:  All  Attention/concentration: Good    Substance Use:   Patient did report a family history of substance use concerns on mothers side-alcohol. Patient has not received chemical dependency treatment in the past.  Patient has not ever been to detox.      Patient is not currently receiving any chemical dependency treatment.           Substance History of use Age of first use Date of last use     Pattern and duration of use (include amounts and frequency)   Alcohol currently use   19 03/24/24 Reports a dink or two a week, socially only.   Cannabis   used in the past 22 04/22/18 REPORTS SUBSTANCE USE: N/A      Amphetamines   never used     REPORTS SUBSTANCE USE: N/A   Cocaine/crack    never used       REPORTS SUBSTANCE USE: N/A   Hallucinogens never used         REPORTS SUBSTANCE USE: N/A   Inhalants never used         REPORTS SUBSTANCE USE: N/A   Heroin never used         REPORTS SUBSTANCE USE: N/A   Other Opiates never used     REPORTS SUBSTANCE USE: N/A   Benzodiazepine   never used     REPORTS SUBSTANCE USE: N/A   Barbiturates never used     REPORTS SUBSTANCE USE: N/A   Over the counter meds never used     REPORTS SUBSTANCE USE: N/A   Caffeine currently use 9   One or two cups of coffee a week.   Nicotine  never used     REPORTS SUBSTANCE USE: N/A   Other substances not listed above:  Identify:  never used     REPORTS SUBSTANCE USE: N/A     Patient reported the following problems as a result of their substance use: no problems, not applicable.    Substance Use: No symptoms    Based on the negative CAGE score and clinical interview there  are not indications of drug or alcohol abuse.    Significant Losses / Trauma / Abuse / Neglect Issues:   Patient did not  serve in the .  There are indications or report of significant loss, trauma, abuse or neglect issues related to:  emotional trauma from family of origin .  Concerns for possible neglect are not present.     Safety Assessment:   Patient denies current homicidal ideation and behaviors.  Patient denies current self-injurious ideation and behaviors.    Patient denied risk behaviors associated with substance use.   Patient denies any high risk behaviors associated with mental health symptoms.  Patient reports the following current concerns for their personal safety: None.  Patient reports there are not firearms in the house.       There are no firearms in the home..    History of Safety Concerns:  Patient denied a history of homicidal ideation.     Patient denied a history of personal safety concerns.    Patient denied a history of assaultive behaviors.     Patient denied a history of sexual assault behaviors.     Patient denied a history of risk behaviors associated with substance use.  Patient denies any history of high risk behaviors associated with mental health symptoms.  Patient reports the following protective factors: forward or future oriented thinking; dedication to family or friends; safe and stable environment; regular physical activity; sense of belonging; secure attachment; adherence with prescribed medication; living with other people; commitment to well being    Risk Plan:  See Recommendations for Safety and Risk Management Plan    Review of Symptoms per patient report:   Depression: Change in sleep, Difficulties concentrating, and Low self-worth  Analilia:  No Symptoms  Psychosis: No Symptoms  Anxiety: Excessive worry, Nervousness, Sleep disturbance, Ruminations, and Poor concentration  Panic:  No symptoms  Post Traumatic Stress Disorder:  No Symptoms   Eating Disorder: No Symptoms  ADD / ADHD:  No symptoms  Conduct Disorder: No symptoms  Autism Spectrum Disorder: No symptoms  Obsessive Compulsive Disorder: No Symptoms    Patient reports the following compulsive behaviors and treatment history:  NA .      Diagnostic Criteria:   Generalized Anxiety Disorder  A. Excessive anxiety and worry about a number of events or activities (such as work or school performance).   B. The person finds it difficult to control the worry.  C. Select 3 or more symptoms (required for diagnosis). Only one item is required in children.   - Being easily fatigued.    - Difficulty concentrating or mind going blank.    - Sleep disturbance (difficulty falling or staying asleep, or restless unsatisfying sleep).   D. The focus of the anxiety and worry is not confined to features of an Axis I disorder.  E. The anxiety, worry, or physical symptoms cause clinically significant distress or impairment in social, occupational, or other important areas of functioning.   F. The disturbance  is not due to the direct physiological effects of a substance (e.g., a drug of abuse, a medication) or a general medical condition (e.g., hyperthyroidism) and does not occur exclusively during a Mood Disorder, a Psychotic Disorder, or a Pervasive Developmental Disorder.    - The aformentioned symptoms began several year(s) ago and occurs 7 days per week and is experienced as mild.    Functional Status:  Patient reports the following functional impairments:  relationship(s).     Nonprogrammatic care:  Patient is requesting basic services to address current mental health concerns.    Clinical Summary:  1. Psychosocial, Cultural and Contextual Factors: lives with partner  .  2. Principal DSM5 Diagnoses  (Sustained by DSM5 Criteria Listed Above):   300.02 (F41.1) Generalized Anxiety Disorder.  3. Other Diagnoses that is relevant to services:   NA.  4. Provisional Diagnosis:  NA.  5. Prognosis: Expect Improvement and Maintain Current Status / Prevent Deterioration.  6. Likely consequences of symptoms if not treated: worsening symptoms, increase in functional impairment.  7. Client strengths include:  educated, employed, goal-focused, good listener, has a previous history of therapy, insightful, intelligent, motivated, open to learning, open to suggestions / feedback, support of family, friends and providers, supportive, wants to learn, and willing to ask questions .     Recommendations:     1. Plan for Safety and Risk Management:   Safety and Risk: Recommended that patient call 911 or go to the local ED should there be a change in any of these risk factors..          Report to child / adult protection services was NA.     2. Patient's identified  no health or cultural concerns .     3. Initial Treatment will focus on:    Anxiety - QIAN  Functional Impairment at: relationships .     4. Resources/Service Plan:    services are not indicated.   Modifications to assist communication are not indicated.   Additional  disability accommodations are not indicated.      5. Collaboration:   Collaboration / coordination of treatment will be initiated with the following  support professionals: primary care physician.      6.  Referrals:   The following referral(s) will be initiated:  NA .       A Release of Information has been obtained for the following:  NA .     Clinical Substantiation/medical necessity for the above recommendations:  individual therapy is necessary in order to alleviate current symptoms and restore relationship functioning.    7. ANTONI:    ANTONI:  Not identified.    8. Records:   These were reviewed at time of assessment.   Information in this assessment was obtained from the medical record and  provided by patient who is a good historian.    Patient will have open access to their mental health medical record.    9.   Interactive Complexity: No    10. Safety Plan:   Patient noted brief suicidal ideation without intent, or plan when he was in high school-which was related to friend issues.  None since that time (2012).       Provider Name/ Credentials:  Freida Patino MS, [facility]  March 25, 2024

## 2024-04-25 ENCOUNTER — VIRTUAL VISIT (OUTPATIENT)
Dept: PSYCHOLOGY | Facility: CLINIC | Age: 29
End: 2024-04-25
Payer: COMMERCIAL

## 2024-04-25 DIAGNOSIS — F41.1 GAD (GENERALIZED ANXIETY DISORDER): Primary | ICD-10-CM

## 2024-04-25 PROCEDURE — 90837 PSYTX W PT 60 MINUTES: CPT | Mod: 95

## 2024-04-25 NOTE — PROGRESS NOTES
[facility]                                     Progress Note    Patient Name: Nghia Gray  Date: 04/25/2024         Service Type: Individual      Session Start Time: 0831  Session End Time: 0926     Session Length: 54    Session #: 3    Attendees: Client attended alone    Service Modality:  Video Visit:      Provider verified identity through the following two step process.  Patient provided:  Patient is known previously to provider    Telemedicine Visit: The patient's condition can be safely assessed and treated via synchronous audio and visual telemedicine encounter.      Reason for Telemedicine Visit: Patient has requested telehealth visit    Originating Site (Patient Location): Patient's home    Distant Site (Provider Location): Provider Remote Setting- Home Office    Consent:  The patient/guardian has verbally consented to: the potential risks and benefits of telemedicine (video visit) versus in person care; bill my insurance or make self-payment for services provided; and responsibility for payment of non-covered services.     Patient would like the video invitation sent by:  My Chart    Mode of Communication:  Video Conference via Amwell    Distant Location (Provider):  Off-site    As the provider I attest to compliance with applicable laws and regulations related to telemedicine.    DATA  Interactive Complexity: No  Crisis: No        Progress Since Last Session (Related to Symptoms / Goals / Homework):   Symptoms: No change overall    Homework:  had not been assigned      Episode of Care Goals: Satisfactory progress - PREPARATION (Decided to change - considering how); Intervened by negotiating a change plan and determining options / strategies for behavior change, identifying triggers, exploring social supports, and working towards setting a date to begin behavior change     Current / Ongoing Stressors and Concerns:   Assertiveness and boundary setting  with mother, self validation     Treatment Objective(s) Addressed in This Session:   compile a list of boundaries that they would like to set with others. Mother   Learn and recognize cues     Intervention:   Exploration today of cues that indicate to ct that he is uncomfortable with various situations. Discussion of the I feel, when you, can we, so that assertiveness formula. Practice with this in session, processing of thoughts and emotions.    Assessments completed prior to visit:  The following assessments were completed by patient for this visit:  PHQ9:       3/19/2024     5:17 PM 3/25/2024     8:01 AM   PHQ-9 SCORE   PHQ-9 Total Score MyChart  6 (Mild depression)   PHQ-9 Total Score 7 6     GAD7:       3/19/2024     5:17 PM   QIAN-7 SCORE   Total Score 4     PROMIS 10-Global Health (all questions and answers displayed):       3/25/2024     8:14 AM   PROMIS 10   In general, would you say your health is: Fair   In general, would you say your quality of life is: Good   In general, how would you rate your physical health? Fair   In general, how would you rate your mental health, including your mood and your ability to think? Good   In general, how would you rate your satisfaction with your social activities and relationships? Good   In general, please rate how well you carry out your usual social activities and roles Very good   To what extent are you able to carry out your everyday physical activities such as walking, climbing stairs, carrying groceries, or moving a chair? Completely   In the past 7 days, how often have you been bothered by emotional problems such as feeling anxious, depressed, or irritable? Sometimes   In the past 7 days, how would you rate your fatigue on average? Moderate   In the past 7 days, how would you rate your pain on average, where 0 means no pain, and 10 means worst imaginable pain? 3   In general, would you say your health is: 2   In general, would you say your quality of life is: 3    In general, how would you rate your physical health? 2   In general, how would you rate your mental health, including your mood and your ability to think? 3   In general, how would you rate your satisfaction with your social activities and relationships? 3   In general, please rate how well you carry out your usual social activities and roles. (This includes activities at home, at work and in your community, and responsibilities as a parent, child, spouse, employee, friend, etc.) 4   To what extent are you able to carry out your everyday physical activities such as walking, climbing stairs, carrying groceries, or moving a chair? 5   In the past 7 days, how often have you been bothered by emotional problems such as feeling anxious, depressed, or irritable? 3   In the past 7 days, how would you rate your fatigue on average? 3   In the past 7 days, how would you rate your pain on average, where 0 means no pain, and 10 means worst imaginable pain? 3   Global Mental Health Score 12   Global Physical Health Score 14   PROMIS TOTAL - SUBSCORES 26         ASSESSMENT: Current Emotional / Mental Status (status of significant symptoms):   Risk status (Self / Other harm or suicidal ideation)   Patient denies current fears or concerns for personal safety.   Patient denies current or recent suicidal ideation or behaviors.   Patient denies current or recent homicidal ideation or behaviors.   Patient denies current or recent self injurious behavior or ideation.   Patient denies other safety concerns.   Patient reports there has been no change in risk factors since their last session.     Patient reports there has been no change in protective factors since their last session.     Recommended that patient call 911 or go to the local ED should there be a change in any of these risk factors.     Appearance:   Appropriate    Eye Contact:   Good    Psychomotor Behavior: Normal    Attitude:   Cooperative     Orientation:   All   Speech    Rate / Production: Normal     Volume:  Normal    Mood:    Normal   Affect:    Appropriate    Thought Content:  Clear    Thought Form:  Coherent  Logical    Insight:    Good      Medication Review:   No changes to current psychiatric medication(s)     Medication Compliance:   Yes     Changes in Health Issues:   None reported     Chemical Use Review:   Substance Use: Chemical use reviewed, no active concerns identified      Tobacco Use: No current tobacco use.      Diagnosis:  1. QIAN (generalized anxiety disorder)        Collateral Reports Completed:   Not Applicable    PLAN: (Patient Tasks / Therapist Tasks / Other)  Meet in two weeks, client to begin building awareness of cues.        JULIÁN Eagle                                                         ______________________________________________________________________          JULIÁN Eagle  April 25, 2024               Individual Treatment Plan    Patient's Name: Nghia Gray  YOB: 1995    Date of Creation: 4/11/2024  Date Treatment Plan Last Reviewed/Revised: 4/11/2024    DSM5 Diagnoses: 300.02 (F41.1) Generalized Anxiety Disorder  Psychosocial / Contextual Factors: lives in own home/apt with significant other, employed fulltime  PROMIS (reviewed every 90 days): 3/25/24    Referral / Collaboration:  Referral to another professional/service is not indicated at this time.    Anticipated number of session for this episode of care: 9-12 sessions  Anticipation frequency of session: Every other week  Anticipated Duration of each session: 38-52 minutes  Treatment plan will be reviewed in 90 days or when goals have been changed.       MeasurableTreatment Goal(s) related to diagnosis / functional impairment(s)  Goal 1: Patient will decrease anxiety.    I will know I've met my goal when I feel no anxiety or stress about what my family thinks of me.      Objective #A (Patient Action)    Patient will  compile a list of boundaries that they would like to set with others. Family and mother .  Status: New - Date: 2024      Intervention(s)  Therapist will teach about healthy boundaries.   .    Objective #B  Patient will learn & utilize at least 1 assertive communication skills weekly.  Status: New - Date: 2024      Intervention(s)  Therapist will teach assertiveness skills.   .    Objective #C  Patient will Identify negative self-talk and behaviors: challenge core beliefs, myths, and actions.  Status: New - Date: 2024      Intervention(s)  Therapist will provide psychoeducation, behavioral activation, and cognitive restructuring..    Objective #B  Patient will  be able to self-validate at times he feels he has made a mistake of some kind .    Status: New - Date: 2024      Intervention(s)  Therapist will provide psychoeducation, behavioral activation, and cognitive restructuring..    Patient has reviewed and agreed to the above plan.      Freida Patino [facility]  2024    [facility]      PATIENT'S NAME: Nghia Gray  PREFERRED NAME: Nghia  PRONOUNS:      he him his  MRN: [id number]  : 1995  ADDRESS: [address] [place] [address]  ACCT. NUMBER:  [id number]  DATE OF SERVICE: 24  START TIME: 730  END TIME: 815  PREFERRED PHONE: [phone]  May we leave a program related message: Yes  EMERGENCY CONTACT: was obtained Partner Yanique AlcalaDovmk-[phone] .  SERVICE MODALITY:  Video Visit:      Provider verified identity through the following two step process.  Patient provided:  Patient  and Patient address    Telemedicine Visit: The patient's condition can be safely assessed and treated via synchronous audio and visual telemedicine encounter.      Reason for Telemedicine Visit: Patient has requested telehealth visit    Originating Site (Patient Location): Patient's home    Distant Site (Provider Location): Provider Remote Setting- Home  "Office    Consent:  The patient/guardian has verbally consented to: the potential risks and benefits of telemedicine (video visit) versus in person care; bill my insurance or make self-payment for services provided; and responsibility for payment of non-covered services.     Patient would like the video invitation sent by:  My Chart    Mode of Communication:  Video Conference via Amwell    Distant Location (Provider):  Off-site    As the provider I attest to compliance with applicable laws and regulations related to telemedicine.    [facility] ADULT Mental Health DIAGNOSTIC ASSESSMENT    Identifying Information:  Patient is a 28 year old,   individual.  Patient was referred for an assessment by referring provider.  Patient attended the session .    Chief Complaint:   The reason for seeking services at this time is: \"Family emotional trauma coupled with long-term relationship and working through the new issues it presents\".  The problem(s) began 03/25/24.    Patient has attempted to resolve these concerns in the past through therapy-has had three therapists, did not feel they were great fits for him .    Social/Family History:  Patient reported they grew up in other [place], [place].  They were raised by biological parents  .  Parents were always together.  Patient reported that their childhood was good as far as he can recall.  Mother stayed home with the children.  Father worked full time, didn't see him from morning till night.  Tenuous relationships with siblings, grew out of that as they matured.  His mother was a very anxious person who didn't take care of her anxiety or go to therapy for this.  This affected pt and his sister in different ways.  Patient described their current relationships with family of origin as decent overall.     The patient describes their cultural background as .  Cultural influences and impact on patient's life structure, values, norms, and healthcare: Parents raised us " Mu-ism, I  from the Yazidism as soon as I went to college.  Contextual influences on patient's health include: Contextual Factors: Individual Factors symptoms of anxiety, boundary concerns and relationship navigation .    These factors will be addressed in the Preliminary Treatment plan. Patient identified their preferred language to be English. Patient reported they does not need the assistance of an  or other support involved in therapy.     Patient reported had no significant delays in developmental tasks.   Patient's highest education level was college graduate  .  Patient identified the following learning problems: none reported.  Modifications will not be used to assist communication in therapy.  Patient reports they are  able to understand written materials.    Patient's current relationship status is has a partner or significant other.   Patient identified their sexual orientation as heterosexual.  Patient reported having   0 child(pérez). Patient identified partner as part of their support system.  Patient identified the quality of these relationships as inconsistent,  .      Patient's current living/housing situation involves staying with someone.  The immediate members of family and household include Christian Chanel,Partner and they report that housing is stable.    Patient is currently employed fulltime.  Patient reports their finances are obtained through employment; parents. Patient does identify finances as a current stressor.      Patient reported that they have not been involved with the legal system.  . Patient does not report being under probation/ parole/ jurisdiction. They are not under any current court jurisdiction. .    Patient's Strengths and Limitations:  Patient identified the following strengths or resources that will help them succeed in treatment:  stable relationship who is supportive, good friend support . Things that may interfere with the patient's success in treatment  include:  inability to unpack the familial concerns .     Assessments:  The following assessments were completed by patient for this visit:  PHQ9:       3/19/2024     5:17 PM 3/25/2024     8:01 AM   PHQ-9 SCORE   PHQ-9 Total Score MyChart  6 (Mild depression)   PHQ-9 Total Score 7 6     GAD7:       3/19/2024     5:17 PM   QIAN-7 SCORE   Total Score 4     CAGE-AID:       3/25/2024     8:15 AM   CAGE-AID Total Score   Total Score 0   Total Score MyChart 0 (A total score of 2 or greater is considered clinically significant)     PROMIS 10-Global Health (all questions and answers displayed):       3/25/2024     8:14 AM   PROMIS 10   In general, would you say your health is: Fair   In general, would you say your quality of life is: Good   In general, how would you rate your physical health? Fair   In general, how would you rate your mental health, including your mood and your ability to think? Good   In general, how would you rate your satisfaction with your social activities and relationships? Good   In general, please rate how well you carry out your usual social activities and roles Very good   To what extent are you able to carry out your everyday physical activities such as walking, climbing stairs, carrying groceries, or moving a chair? Completely   In the past 7 days, how often have you been bothered by emotional problems such as feeling anxious, depressed, or irritable? Sometimes   In the past 7 days, how would you rate your fatigue on average? Moderate   In the past 7 days, how would you rate your pain on average, where 0 means no pain, and 10 means worst imaginable pain? 3   In general, would you say your health is: 2   In general, would you say your quality of life is: 3   In general, how would you rate your physical health? 2   In general, how would you rate your mental health, including your mood and your ability to think? 3   In general, how would you rate your satisfaction with your social activities and  relationships? 3   In general, please rate how well you carry out your usual social activities and roles. (This includes activities at home, at work and in your community, and responsibilities as a parent, child, spouse, employee, friend, etc.) 4   To what extent are you able to carry out your everyday physical activities such as walking, climbing stairs, carrying groceries, or moving a chair? 5   In the past 7 days, how often have you been bothered by emotional problems such as feeling anxious, depressed, or irritable? 3   In the past 7 days, how would you rate your fatigue on average? 3   In the past 7 days, how would you rate your pain on average, where 0 means no pain, and 10 means worst imaginable pain? 3   Global Mental Health Score 12   Global Physical Health Score 14   PROMIS TOTAL - SUBSCORES 26     [place] Suicide Severity Rating Scale (Lifetime/Recent)      3/25/2024     8:00 AM   [place] Suicide Severity Rating (Lifetime/Recent)   Q1 Wish to be Dead (Lifetime) N   Q2 Non-Specific Active Suicidal Thoughts (Lifetime) Y   Non-Specific Active Suicidal Thought Description (Lifetime) [id number] in high school-friend issues at school.   2. Non-Specific Active Suicidal Thoughts (Past 1 Month) N   3. Active Suicidal Ideation with any Methods (Not Plan) Without Intent to Act (Lifetime) N   Q4 Active Suicidal Ideation with Some Intent to Act, Without Specific Plan (Lifetime) N   Q5 Active Suicidal Ideation with Specific Plan and Intent (Lifetime) N   Actual Attempt (Lifetime) N   Has subject engaged in non-suicidal self-injurious behavior? (Lifetime) N   Interrupted Attempts (Lifetime) N   Aborted or Self-Interrupted Attempt (Lifetime) N   Preparatory Acts or Behavior (Lifetime) N   Calculated C-SSRS Risk Score (Lifetime/Recent) No Risk Indicated       Personal and Family Medical History:  Patient does report a family history of mental health concerns.  Patient reports family history includes Anxiety Disorder in  his maternal grandmother, mother, and sister; Colon Cancer in his father; Obesity in his paternal grandmother..     Patient does report Mental Health Diagnosis and/or Treatment.  Patient reported the following previous diagnoses which include(s): an anxiety disorder .  Patient reported symptoms began in high school as a Rick.  Patient has received mental health services in the past:  therapy  .  Psychiatric Hospitalizations: none .  Patient denies a history of civil commitment.      Currently, patient none  is receiving other mental health services.  These include none.       Patient has had a physical exam to rule out medical causes for current symptoms.  Date of last physical exam was within the past year. Client was encouraged to follow up with PCP if symptoms were to develop. The patient has a [facility] Provider, who is named matt Wheatley Patient reports no current medical and/or dental concerns.  Patient denies any issues with pain..   There are not significant appetite / nutritional concerns / weight changes.   Patient does report a history of head injury / trauma / cognitive impairment. In 7th grade large picture frame fell on him had stitches, and concussion like symptoms.     Patient reports current meds as:   Current Outpatient Medications   Medication Sig Dispense Refill    FLUoxetine (PROZAC) 20 MG capsule Take 1 capsule (20 mg) by mouth daily 90 capsule 2     No current facility-administered medications for this visit.       Medication Adherence:  Patient reports taking.  taking prescribed medications as prescribed.    Patient Allergies:    Allergies   Allergen Reactions    Seasonal Allergies        Medical History:    Past Medical History:   Diagnosis Date    Depressive disorder 2013         Current Mental Status Exam:   Appearance:  Appropriate    Eye Contact:  Good   Psychomotor:  Normal       Gait / station:  Not assessed  Attitude / Demeanor: Cooperative  Pleasant  Speech      Rate /  Production: Normal/ Responsive      Volume:  Normal  volume      Language:  no problems  Mood:   Normal  Affect:   Appropriate    Thought Content: Clear   Thought Process: Coherent  Logical       Associations: No loosening of associations  Insight:   Good   Judgment:  Intact   Orientation:  All  Attention/concentration: Good    Substance Use:   Patient did report a family history of substance use concerns on mothers side-alcohol. Patient has not received chemical dependency treatment in the past.  Patient has not ever been to detox.      Patient is not currently receiving any chemical dependency treatment.           Substance History of use Age of first use Date of last use     Pattern and duration of use (include amounts and frequency)   Alcohol currently use   19 03/24/24 Reports a dink or two a week, socially only.   Cannabis   used in the past 22 04/22/18 REPORTS SUBSTANCE USE: N/A     Amphetamines   never used     REPORTS SUBSTANCE USE: N/A   Cocaine/crack    never used       REPORTS SUBSTANCE USE: N/A   Hallucinogens never used         REPORTS SUBSTANCE USE: N/A   Inhalants never used         REPORTS SUBSTANCE USE: N/A   Heroin never used         REPORTS SUBSTANCE USE: N/A   Other Opiates never used     REPORTS SUBSTANCE USE: N/A   Benzodiazepine   never used     REPORTS SUBSTANCE USE: N/A   Barbiturates never used     REPORTS SUBSTANCE USE: N/A   Over the counter meds never used     REPORTS SUBSTANCE USE: N/A   Caffeine currently use 9   One or two cups of coffee a week.   Nicotine  never used     REPORTS SUBSTANCE USE: N/A   Other substances not listed above:  Identify:  never used     REPORTS SUBSTANCE USE: N/A     Patient reported the following problems as a result of their substance use: no problems, not applicable.    Substance Use: No symptoms    Based on the negative CAGE score and clinical interview there  are not indications of drug or alcohol abuse.    Significant Losses / Trauma / Abuse / Neglect  Issues:   Patient did not  serve in the .  There are indications or report of significant loss, trauma, abuse or neglect issues related to:  emotional trauma from family of origin .  Concerns for possible neglect are not present.     Safety Assessment:   Patient denies current homicidal ideation and behaviors.  Patient denies current self-injurious ideation and behaviors.    Patient denied risk behaviors associated with substance use.   Patient denies any high risk behaviors associated with mental health symptoms.  Patient reports the following current concerns for their personal safety: None.  Patient reports there are not firearms in the house.       There are no firearms in the home..    History of Safety Concerns:  Patient denied a history of homicidal ideation.     Patient denied a history of personal safety concerns.    Patient denied a history of assaultive behaviors.    Patient denied a history of sexual assault behaviors.     Patient denied a history of risk behaviors associated with substance use.  Patient denies any history of high risk behaviors associated with mental health symptoms.  Patient reports the following protective factors: forward or future oriented thinking; dedication to family or friends; safe and stable environment; regular physical activity; sense of belonging; secure attachment; adherence with prescribed medication; living with other people; commitment to well being    Risk Plan:  See Recommendations for Safety and Risk Management Plan    Review of Symptoms per patient report:   Depression: Change in sleep, Difficulties concentrating, and Low self-worth  Analilia:  No Symptoms  Psychosis: No Symptoms  Anxiety: Excessive worry, Nervousness, Sleep disturbance, Ruminations, and Poor concentration  Panic:  No symptoms  Post Traumatic Stress Disorder:  No Symptoms   Eating Disorder: No Symptoms  ADD / ADHD:  No symptoms  Conduct Disorder: No symptoms  Autism Spectrum Disorder: No  symptoms  Obsessive Compulsive Disorder: No Symptoms    Patient reports the following compulsive behaviors and treatment history:  NA .      Diagnostic Criteria:   Generalized Anxiety Disorder  A. Excessive anxiety and worry about a number of events or activities (such as work or school performance).   B. The person finds it difficult to control the worry.  C. Select 3 or more symptoms (required for diagnosis). Only one item is required in children.   - Being easily fatigued.    - Difficulty concentrating or mind going blank.    - Sleep disturbance (difficulty falling or staying asleep, or restless unsatisfying sleep).   D. The focus of the anxiety and worry is not confined to features of an Axis I disorder.  E. The anxiety, worry, or physical symptoms cause clinically significant distress or impairment in social, occupational, or other important areas of functioning.   F. The disturbance is not due to the direct physiological effects of a substance (e.g., a drug of abuse, a medication) or a general medical condition (e.g., hyperthyroidism) and does not occur exclusively during a Mood Disorder, a Psychotic Disorder, or a Pervasive Developmental Disorder.    - The aformentioned symptoms began several year(s) ago and occurs 7 days per week and is experienced as mild.    Functional Status:  Patient reports the following functional impairments:  relationship(s).     Nonprogrammatic care:  Patient is requesting basic services to address current mental health concerns.    Clinical Summary:  1. Psychosocial, Cultural and Contextual Factors: lives with partner  .  2. Principal DSM5 Diagnoses  (Sustained by DSM5 Criteria Listed Above):   300.02 (F41.1) Generalized Anxiety Disorder.  3. Other Diagnoses that is relevant to services:   NA.  4. Provisional Diagnosis:  NA.  5. Prognosis: Expect Improvement and Maintain Current Status / Prevent Deterioration.  6. Likely consequences of symptoms if not treated: worsening symptoms,  increase in functional impairment.  7. Client strengths include:  educated, employed, goal-focused, good listener, has a previous history of therapy, insightful, intelligent, motivated, open to learning, open to suggestions / feedback, support of family, friends and providers, supportive, wants to learn, and willing to ask questions .     Recommendations:     1. Plan for Safety and Risk Management:   Safety and Risk: Recommended that patient call 911 or go to the local ED should there be a change in any of these risk factors..          Report to child / adult protection services was NA.     2. Patient's identified  no health or cultural concerns .     3. Initial Treatment will focus on:    Anxiety - QIAN  Functional Impairment at: relationships .     4. Resources/Service Plan:    services are not indicated.   Modifications to assist communication are not indicated.   Additional disability accommodations are not indicated.      5. Collaboration:   Collaboration / coordination of treatment will be initiated with the following  support professionals: primary care physician.      6.  Referrals:   The following referral(s) will be initiated:  NA .       A Release of Information has been obtained for the following:  NA .     Clinical Substantiation/medical necessity for the above recommendations:  individual therapy is necessary in order to alleviate current symptoms and restore relationship functioning.    7. ANTONI:    ANTONI:  Not identified.    8. Records:   These were reviewed at time of assessment.   Information in this assessment was obtained from the medical record and  provided by patient who is a good historian.    Patient will have open access to their mental health medical record.    9.   Interactive Complexity: No    10. Safety Plan:   Patient noted brief suicidal ideation without intent, or plan when he was in high school-which was related to friend issues.  None since that time (2012).       Provider  Name/ Credentials:  Freida Patino MS, [facility]  March 25, 2024

## 2024-05-09 ENCOUNTER — VIRTUAL VISIT (OUTPATIENT)
Dept: PSYCHOLOGY | Facility: CLINIC | Age: 29
End: 2024-05-09
Payer: COMMERCIAL

## 2024-05-09 DIAGNOSIS — F41.1 GAD (GENERALIZED ANXIETY DISORDER): Primary | ICD-10-CM

## 2024-05-09 PROCEDURE — 90837 PSYTX W PT 60 MINUTES: CPT | Mod: 95

## 2024-05-09 NOTE — PROGRESS NOTES
[facility]                                     Progress Note    Patient Name: Nghia Gray  Date: 05/09/2024         Service Type: Individual      Session Start Time: 1030 Session End Time: 1126     Session Length: 56    Session #: 4    Attendees: Client attended alone    Service Modality:  Video Visit:      Provider verified identity through the following two step process.  Patient provided:  Patient is known previously to provider    Telemedicine Visit: The patient's condition can be safely assessed and treated via synchronous audio and visual telemedicine encounter.      Reason for Telemedicine Visit: Patient has requested telehealth visit    Originating Site (Patient Location): Patient's home    Distant Site (Provider Location): Provider Remote Setting- Home Office    Consent:  The patient/guardian has verbally consented to: the potential risks and benefits of telemedicine (video visit) versus in person care; bill my insurance or make self-payment for services provided; and responsibility for payment of non-covered services.     Patient would like the video invitation sent by:  My Chart    Mode of Communication:  Video Conference via Amwell    Distant Location (Provider):  Off-site    As the provider I attest to compliance with applicable laws and regulations related to telemedicine.    DATA  Interactive Complexity: No  Crisis: No        Progress Since Last Session (Related to Symptoms / Goals / Homework):   Symptoms: No change overall    Homework:  completed, utilized assertiveness formula.      Episode of Care Goals: Satisfactory progress - ACTION (Actively working towards change); Intervened by reinforcing change plan / affirming steps taken     Current / Ongoing Stressors and Concerns:   Assertiveness and boundary setting.          Treatment Objective(s) Addressed in This Session:   Assertive communication  Identification and challenge of anxious  "thinking     Intervention:   Exploration today of assertive communication with difficult topics.  Identification of unspoken family rules that have shaped his thoughts on having difficult conversations with others.  Exploration of challenge of thinking related to these thoughts of \"I don't want to say the wrong thing\" as well as \"I don't want to fail\".  Sent worksheet to aid.  Sent link regarding unspoken family rules.    Assessments completed prior to visit:  The following assessments were completed by patient for this visit:  PHQ9:       3/19/2024     5:17 PM 3/25/2024     8:01 AM   PHQ-9 SCORE   PHQ-9 Total Score MyChart  6 (Mild depression)   PHQ-9 Total Score 7 6     GAD7:       3/19/2024     5:17 PM   QIAN-7 SCORE   Total Score 4     PROMIS 10-Global Health (all questions and answers displayed):       3/25/2024     8:14 AM   PROMIS 10   In general, would you say your health is: Fair   In general, would you say your quality of life is: Good   In general, how would you rate your physical health? Fair   In general, how would you rate your mental health, including your mood and your ability to think? Good   In general, how would you rate your satisfaction with your social activities and relationships? Good   In general, please rate how well you carry out your usual social activities and roles Very good   To what extent are you able to carry out your everyday physical activities such as walking, climbing stairs, carrying groceries, or moving a chair? Completely   In the past 7 days, how often have you been bothered by emotional problems such as feeling anxious, depressed, or irritable? Sometimes   In the past 7 days, how would you rate your fatigue on average? Moderate   In the past 7 days, how would you rate your pain on average, where 0 means no pain, and 10 means worst imaginable pain? 3   In general, would you say your health is: 2   In general, would you say your quality of life is: 3   In general, how would " you rate your physical health? 2   In general, how would you rate your mental health, including your mood and your ability to think? 3   In general, how would you rate your satisfaction with your social activities and relationships? 3   In general, please rate how well you carry out your usual social activities and roles. (This includes activities at home, at work and in your community, and responsibilities as a parent, child, spouse, employee, friend, etc.) 4   To what extent are you able to carry out your everyday physical activities such as walking, climbing stairs, carrying groceries, or moving a chair? 5   In the past 7 days, how often have you been bothered by emotional problems such as feeling anxious, depressed, or irritable? 3   In the past 7 days, how would you rate your fatigue on average? 3   In the past 7 days, how would you rate your pain on average, where 0 means no pain, and 10 means worst imaginable pain? 3   Global Mental Health Score 12   Global Physical Health Score 14   PROMIS TOTAL - SUBSCORES 26         ASSESSMENT: Current Emotional / Mental Status (status of significant symptoms):   Risk status (Self / Other harm or suicidal ideation)   Patient denies current fears or concerns for personal safety.   Patient denies current or recent suicidal ideation or behaviors.   Patient denies current or recent homicidal ideation or behaviors.   Patient denies current or recent self injurious behavior or ideation.   Patient denies other safety concerns.   Patient reports there has been no change in risk factors since their last session.     Patient reports there has been no change in protective factors since their last session.     Recommended that patient call 911 or go to the local ED should there be a change in any of these risk factors.     Appearance:   Appropriate    Eye Contact:   Good    Psychomotor Behavior: Normal    Attitude:   Cooperative    Orientation:   All   Speech    Rate /  Production: Normal     Volume:  Normal    Mood:    Normal   Affect:    Appropriate    Thought Content:  Clear    Thought Form:  Coherent  Logical    Insight:    Good      Medication Review:   No changes to current psychiatric medication(s)     Medication Compliance:   Yes     Changes in Health Issues:   None reported     Chemical Use Review:   Substance Use: Chemical use reviewed, no active concerns identified      Tobacco Use: No current tobacco use.      Diagnosis:  1. QIAN (generalized anxiety disorder)          Collateral Reports Completed:   Not Applicable    PLAN: (Patient Tasks / Therapist Tasks / Other)  Meet in two weeks, client to begin building awareness of cues, challenge of thinking related to difficult conversations.        JULIÁN Eagle                                                         ______________________________________________________________________          JULIÁN Eagle  April 25, 2024               Individual Treatment Plan    Patient's Name: Nghia Gray  YOB: 1995    Date of Creation: 4/11/2024  Date Treatment Plan Last Reviewed/Revised: 4/11/2024    DSM5 Diagnoses: 300.02 (F41.1) Generalized Anxiety Disorder  Psychosocial / Contextual Factors: lives in own home/apt with significant other, employed fulltime  PROMIS (reviewed every 90 days): 3/25/24    Referral / Collaboration:  Referral to another professional/service is not indicated at this time.    Anticipated number of session for this episode of care: 9-12 sessions  Anticipation frequency of session: Every other week  Anticipated Duration of each session: 38-52 minutes  Treatment plan will be reviewed in 90 days or when goals have been changed.       MeasurableTreatment Goal(s) related to diagnosis / functional impairment(s)  Goal 1: Patient will decrease anxiety.    I will know I've met my goal when I feel no anxiety or stress about what my family thinks of me.      Objective #A (Patient  Action)    Patient will compile a list of boundaries that they would like to set with others. Family and mother .  Status: New - Date: 2024      Intervention(s)  Therapist will teach about healthy boundaries.   .    Objective #B  Patient will learn & utilize at least 1 assertive communication skills weekly.  Status: New - Date: 2024      Intervention(s)  Therapist will teach assertiveness skills.   .    Objective #C  Patient will Identify negative self-talk and behaviors: challenge core beliefs, myths, and actions.  Status: New - Date: 2024      Intervention(s)  Therapist will provide psychoeducation, behavioral activation, and cognitive restructuring..    Objective #B  Patient will  be able to self-validate at times he feels he has made a mistake of some kind .    Status: New - Date: 2024      Intervention(s)  Therapist will provide psychoeducation, behavioral activation, and cognitive restructuring..    Patient has reviewed and agreed to the above plan.      Freida Patino [facility]  2024    [facility]      PATIENT'S NAME: Nghia Gray  PREFERRED NAME: Nghia  PRONOUNS:      he him his  MRN: [id number]  : 1995  ADDRESS: [address] [place] [address] [place]. NUMBER:  [id number]  DATE OF SERVICE: 24  START TIME: 730  END TIME: 815  PREFERRED PHONE: [phone]  May we leave a program related message: Yes  EMERGENCY CONTACT: was obtained Partner Yanique AlcalaIyibg-[phone] .  SERVICE MODALITY:  Video Visit:      Provider verified identity through the following two step process.  Patient provided:  Patient  and Patient address    Telemedicine Visit: The patient's condition can be safely assessed and treated via synchronous audio and visual telemedicine encounter.      Reason for Telemedicine Visit: Patient has requested telehealth visit    Originating Site (Patient Location): Patient's home    Distant Site (Provider Location): Provider Remote  "Setting- Home Office    Consent:  The patient/guardian has verbally consented to: the potential risks and benefits of telemedicine (video visit) versus in person care; bill my insurance or make self-payment for services provided; and responsibility for payment of non-covered services.     Patient would like the video invitation sent by:  My Chart    Mode of Communication:  Video Conference via Amwell    Distant Location (Provider):  Off-site    As the provider I attest to compliance with applicable laws and regulations related to telemedicine.    UNIVERSAL ADULT Mental Health DIAGNOSTIC ASSESSMENT    Identifying Information:  Patient is a 28 year old,   individual.  Patient was referred for an assessment by referring provider.  Patient attended the session .    Chief Complaint:   The reason for seeking services at this time is: \"Family emotional trauma coupled with long-term relationship and working through the new issues it presents\".  The problem(s) began 03/25/24.    Patient has attempted to resolve these concerns in the past through therapy-has had three therapists, did not feel they were great fits for him .    Social/Family History:  Patient reported they grew up in other [place], [place].  They were raised by biological parents  .  Parents were always together.  Patient reported that their childhood was good as far as he can recall.  Mother stayed home with the children.  Father worked full time, didn't see him from morning till night.  Tenuous relationships with siblings, grew out of that as they matured.  His mother was a very anxious person who didn't take care of her anxiety or go to therapy for this.  This affected pt and his sister in different ways.  Patient described their current relationships with family of origin as decent overall.     The patient describes their cultural background as .  Cultural influences and impact on patient's life structure, values, norms, and healthcare: " Parents raised us Oriental orthodox, I  from the Oriental orthodox as soon as I went to college.  Contextual influences on patient's health include: Contextual Factors: Individual Factors symptoms of anxiety, boundary concerns and relationship navigation .    These factors will be addressed in the Preliminary Treatment plan. Patient identified their preferred language to be English. Patient reported they does not need the assistance of an  or other support involved in therapy.     Patient reported had no significant delays in developmental tasks.   Patient's highest education level was college graduate  .  Patient identified the following learning problems: none reported.  Modifications will not be used to assist communication in therapy.  Patient reports they are  able to understand written materials.    Patient's current relationship status is has a partner or significant other.   Patient identified their sexual orientation as heterosexual.  Patient reported having   0 child(pérez). Patient identified partner as part of their support system.  Patient identified the quality of these relationships as inconsistent,  .      Patient's current living/housing situation involves staying with someone.  The immediate members of family and household include Christian Chanel,Partner and they report that housing is stable.    Patient is currently employed fulltime.  Patient reports their finances are obtained through employment; parents. Patient does identify finances as a current stressor.      Patient reported that they have not been involved with the legal system.  . Patient does not report being under probation/ parole/ jurisdiction. They are not under any current court jurisdiction. .    Patient's Strengths and Limitations:  Patient identified the following strengths or resources that will help them succeed in treatment:  stable relationship who is supportive, good friend support . Things that may interfere with the patient's  success in treatment include:  inability to unpack the familial concerns .     Assessments:  The following assessments were completed by patient for this visit:  PHQ9:       3/19/2024     5:17 PM 3/25/2024     8:01 AM   PHQ-9 SCORE   PHQ-9 Total Score MyChart  6 (Mild depression)   PHQ-9 Total Score 7 6     GAD7:       3/19/2024     5:17 PM   QIAN-7 SCORE   Total Score 4     CAGE-AID:       3/25/2024     8:15 AM   CAGE-AID Total Score   Total Score 0   Total Score MyChart 0 (A total score of 2 or greater is considered clinically significant)     PROMIS 10-Global Health (all questions and answers displayed):       3/25/2024     8:14 AM   PROMIS 10   In general, would you say your health is: Fair   In general, would you say your quality of life is: Good   In general, how would you rate your physical health? Fair   In general, how would you rate your mental health, including your mood and your ability to think? Good   In general, how would you rate your satisfaction with your social activities and relationships? Good   In general, please rate how well you carry out your usual social activities and roles Very good   To what extent are you able to carry out your everyday physical activities such as walking, climbing stairs, carrying groceries, or moving a chair? Completely   In the past 7 days, how often have you been bothered by emotional problems such as feeling anxious, depressed, or irritable? Sometimes   In the past 7 days, how would you rate your fatigue on average? Moderate   In the past 7 days, how would you rate your pain on average, where 0 means no pain, and 10 means worst imaginable pain? 3   In general, would you say your health is: 2   In general, would you say your quality of life is: 3   In general, how would you rate your physical health? 2   In general, how would you rate your mental health, including your mood and your ability to think? 3   In general, how would you rate your satisfaction with your  social activities and relationships? 3   In general, please rate how well you carry out your usual social activities and roles. (This includes activities at home, at work and in your community, and responsibilities as a parent, child, spouse, employee, friend, etc.) 4   To what extent are you able to carry out your everyday physical activities such as walking, climbing stairs, carrying groceries, or moving a chair? 5   In the past 7 days, how often have you been bothered by emotional problems such as feeling anxious, depressed, or irritable? 3   In the past 7 days, how would you rate your fatigue on average? 3   In the past 7 days, how would you rate your pain on average, where 0 means no pain, and 10 means worst imaginable pain? 3   Global Mental Health Score 12   Global Physical Health Score 14   PROMIS TOTAL - SUBSCORES 26     Larimer Suicide Severity Rating Scale (Lifetime/Recent)      3/25/2024     8:00 AM   Larimer Suicide Severity Rating (Lifetime/Recent)   Q1 Wish to be Dead (Lifetime) N   Q2 Non-Specific Active Suicidal Thoughts (Lifetime) Y   Non-Specific Active Suicidal Thought Description (Lifetime) 3821-2529 in high school-friend issues at school.   2. Non-Specific Active Suicidal Thoughts (Past 1 Month) N   3. Active Suicidal Ideation with any Methods (Not Plan) Without Intent to Act (Lifetime) N   Q4 Active Suicidal Ideation with Some Intent to Act, Without Specific Plan (Lifetime) N   Q5 Active Suicidal Ideation with Specific Plan and Intent (Lifetime) N   Actual Attempt (Lifetime) N   Has subject engaged in non-suicidal self-injurious behavior? (Lifetime) N   Interrupted Attempts (Lifetime) N   Aborted or Self-Interrupted Attempt (Lifetime) N   Preparatory Acts or Behavior (Lifetime) N   Calculated C-SSRS Risk Score (Lifetime/Recent) No Risk Indicated       Personal and Family Medical History:  Patient does report a family history of mental health concerns.  Patient reports family history includes  Anxiety Disorder in his maternal grandmother, mother, and sister; Colon Cancer in his father; Obesity in his paternal grandmother..     Patient does report Mental Health Diagnosis and/or Treatment.  Patient reported the following previous diagnoses which include(s): an anxiety disorder .  Patient reported symptoms began in high school as a Rick.  Patient has received mental health services in the past:  therapy  .  Psychiatric Hospitalizations: none .  Patient denies a history of civil commitment.      Currently, patient none  is receiving other mental health services.  These include none.       Patient has had a physical exam to rule out medical causes for current symptoms.  Date of last physical exam was within the past year. Client was encouraged to follow up with PCP if symptoms were to develop. The patient has a [facility] Provider, who is named matt Wheatley Patient reports no current medical and/or dental concerns.  Patient denies any issues with pain..   There are not significant appetite / nutritional concerns / weight changes.   Patient does report a history of head injury / trauma / cognitive impairment. In 7th grade large picture frame fell on him had stitches, and concussion like symptoms.     Patient reports current meds as:   Current Outpatient Medications   Medication Sig Dispense Refill    FLUoxetine (PROZAC) 20 MG capsule Take 1 capsule (20 mg) by mouth daily 90 capsule 2     No current facility-administered medications for this visit.       Medication Adherence:  Patient reports taking.  taking prescribed medications as prescribed.    Patient Allergies:    Allergies   Allergen Reactions    Seasonal Allergies        Medical History:    Past Medical History:   Diagnosis Date    Depressive disorder 2013         Current Mental Status Exam:   Appearance:  Appropriate    Eye Contact:  Good   Psychomotor:  Normal       Gait / station:  Not assessed  Attitude / Demeanor: Cooperative   Pleasant  Speech      Rate / Production: Normal/ Responsive      Volume:  Normal  volume      Language:  no problems  Mood:   Normal  Affect:   Appropriate    Thought Content: Clear   Thought Process: Coherent  Logical       Associations: No loosening of associations  Insight:   Good   Judgment:  Intact   Orientation:  All  Attention/concentration: Good    Substance Use:   Patient did report a family history of substance use concerns on mothers side-alcohol. Patient has not received chemical dependency treatment in the past.  Patient has not ever been to detox.      Patient is not currently receiving any chemical dependency treatment.           Substance History of use Age of first use Date of last use     Pattern and duration of use (include amounts and frequency)   Alcohol currently use   19 03/24/24 Reports a dink or two a week, socially only.   Cannabis   used in the past 22 04/22/18 REPORTS SUBSTANCE USE: N/A     Amphetamines   never used     REPORTS SUBSTANCE USE: N/A   Cocaine/crack    never used       REPORTS SUBSTANCE USE: N/A   Hallucinogens never used         REPORTS SUBSTANCE USE: N/A   Inhalants never used         REPORTS SUBSTANCE USE: N/A   Heroin never used         REPORTS SUBSTANCE USE: N/A   Other Opiates never used     REPORTS SUBSTANCE USE: N/A   Benzodiazepine   never used     REPORTS SUBSTANCE USE: N/A   Barbiturates never used     REPORTS SUBSTANCE USE: N/A   Over the counter meds never used     REPORTS SUBSTANCE USE: N/A   Caffeine currently use 9   One or two cups of coffee a week.   Nicotine  never used     REPORTS SUBSTANCE USE: N/A   Other substances not listed above:  Identify:  never used     REPORTS SUBSTANCE USE: N/A     Patient reported the following problems as a result of their substance use: no problems, not applicable.    Substance Use: No symptoms    Based on the negative CAGE score and clinical interview there  are not indications of drug or alcohol abuse.    Significant Losses  / Trauma / Abuse / Neglect Issues:   Patient did not  serve in the .  There are indications or report of significant loss, trauma, abuse or neglect issues related to:  emotional trauma from family of origin .  Concerns for possible neglect are not present.     Safety Assessment:   Patient denies current homicidal ideation and behaviors.  Patient denies current self-injurious ideation and behaviors.    Patient denied risk behaviors associated with substance use.   Patient denies any high risk behaviors associated with mental health symptoms.  Patient reports the following current concerns for their personal safety: None.  Patient reports there are not firearms in the house.       There are no firearms in the home..    History of Safety Concerns:  Patient denied a history of homicidal ideation.     Patient denied a history of personal safety concerns.    Patient denied a history of assaultive behaviors.    Patient denied a history of sexual assault behaviors.     Patient denied a history of risk behaviors associated with substance use.  Patient denies any history of high risk behaviors associated with mental health symptoms.  Patient reports the following protective factors: forward or future oriented thinking; dedication to family or friends; safe and stable environment; regular physical activity; sense of belonging; secure attachment; adherence with prescribed medication; living with other people; commitment to well being    Risk Plan:  See Recommendations for Safety and Risk Management Plan    Review of Symptoms per patient report:   Depression: Change in sleep, Difficulties concentrating, and Low self-worth  Analilia:  No Symptoms  Psychosis: No Symptoms  Anxiety: Excessive worry, Nervousness, Sleep disturbance, Ruminations, and Poor concentration  Panic:  No symptoms  Post Traumatic Stress Disorder:  No Symptoms   Eating Disorder: No Symptoms  ADD / ADHD:  No symptoms  Conduct Disorder: No symptoms  Autism  Spectrum Disorder: No symptoms  Obsessive Compulsive Disorder: No Symptoms    Patient reports the following compulsive behaviors and treatment history:  NA .      Diagnostic Criteria:   Generalized Anxiety Disorder  A. Excessive anxiety and worry about a number of events or activities (such as work or school performance).   B. The person finds it difficult to control the worry.  C. Select 3 or more symptoms (required for diagnosis). Only one item is required in children.   - Being easily fatigued.    - Difficulty concentrating or mind going blank.    - Sleep disturbance (difficulty falling or staying asleep, or restless unsatisfying sleep).   D. The focus of the anxiety and worry is not confined to features of an Axis I disorder.  E. The anxiety, worry, or physical symptoms cause clinically significant distress or impairment in social, occupational, or other important areas of functioning.   F. The disturbance is not due to the direct physiological effects of a substance (e.g., a drug of abuse, a medication) or a general medical condition (e.g., hyperthyroidism) and does not occur exclusively during a Mood Disorder, a Psychotic Disorder, or a Pervasive Developmental Disorder.    - The aformentioned symptoms began several year(s) ago and occurs 7 days per week and is experienced as mild.    Functional Status:  Patient reports the following functional impairments:  relationship(s).     Nonprogrammatic care:  Patient is requesting basic services to address current mental health concerns.    Clinical Summary:  1. Psychosocial, Cultural and Contextual Factors: lives with partner  .  2. Principal DSM5 Diagnoses  (Sustained by DSM5 Criteria Listed Above):   300.02 (F41.1) Generalized Anxiety Disorder.  3. Other Diagnoses that is relevant to services:   NA.  4. Provisional Diagnosis:  NA.  5. Prognosis: Expect Improvement and Maintain Current Status / Prevent Deterioration.  6. Likely consequences of symptoms if not  treated: worsening symptoms, increase in functional impairment.  7. Client strengths include:  educated, employed, goal-focused, good listener, has a previous history of therapy, insightful, intelligent, motivated, open to learning, open to suggestions / feedback, support of family, friends and providers, supportive, wants to learn, and willing to ask questions .     Recommendations:     1. Plan for Safety and Risk Management:   Safety and Risk: Recommended that patient call 911 or go to the local ED should there be a change in any of these risk factors..          Report to child / adult protection services was NA.     2. Patient's identified  no health or cultural concerns .     3. Initial Treatment will focus on:    Anxiety - QIAN  Functional Impairment at: relationships .     4. Resources/Service Plan:    services are not indicated.   Modifications to assist communication are not indicated.   Additional disability accommodations are not indicated.      5. Collaboration:   Collaboration / coordination of treatment will be initiated with the following  support professionals: primary care physician.      6.  Referrals:   The following referral(s) will be initiated:  NA .       A Release of Information has been obtained for the following:  NA .     Clinical Substantiation/medical necessity for the above recommendations:  individual therapy is necessary in order to alleviate current symptoms and restore relationship functioning.    7. ANTONI:    ANTONI:  Not identified.    8. Records:   These were reviewed at time of assessment.   Information in this assessment was obtained from the medical record and  provided by patient who is a good historian.    Patient will have open access to their mental health medical record.    9.   Interactive Complexity: No    10. Safety Plan:   Patient noted brief suicidal ideation without intent, or plan when he was in high school-which was related to friend issues.  None since that  time (2012).       Provider Name/ Credentials:  Freida Patino MS, [facility]  March 25, 2024

## 2024-05-19 ENCOUNTER — HEALTH MAINTENANCE LETTER (OUTPATIENT)
Age: 29
End: 2024-05-19

## 2024-05-23 ENCOUNTER — VIRTUAL VISIT (OUTPATIENT)
Dept: PSYCHOLOGY | Facility: CLINIC | Age: 29
End: 2024-05-23
Payer: COMMERCIAL

## 2024-05-23 DIAGNOSIS — F41.1 GAD (GENERALIZED ANXIETY DISORDER): Primary | ICD-10-CM

## 2024-05-23 PROCEDURE — 90837 PSYTX W PT 60 MINUTES: CPT | Mod: 95

## 2024-05-23 NOTE — PROGRESS NOTES
[facility]                                     Progress Note    Patient Name: Nghia Gray  Date: 05/23/2024         Service Type: Individual      Session Start Time: 1031 Session End Time: 1128     Session Length: 57    Session #: 5    Attendees: Client attended alone    Service Modality:  Video Visit:      Provider verified identity through the following two step process.  Patient provided:  Patient is known previously to provider    Telemedicine Visit: The patient's condition can be safely assessed and treated via synchronous audio and visual telemedicine encounter.      Reason for Telemedicine Visit: Patient has requested telehealth visit    Originating Site (Patient Location): Patient's home    Distant Site (Provider Location): Provider Remote Setting- Home Office    Consent:  The patient/guardian has verbally consented to: the potential risks and benefits of telemedicine (video visit) versus in person care; bill my insurance or make self-payment for services provided; and responsibility for payment of non-covered services.     Patient would like the video invitation sent by:  My Chart    Mode of Communication:  Video Conference via Amwell    Distant Location (Provider):  Off-site    As the provider I attest to compliance with applicable laws and regulations related to telemedicine.    DATA  Interactive Complexity: No  Crisis: No        Progress Since Last Session (Related to Symptoms / Goals / Homework):   Symptoms: No change overall    Homework:  completed, read about unspoken family rules, challenged thinking in session regrding failure      Episode of Care Goals: Satisfactory progress - ACTION (Actively working towards change); Intervened by reinforcing change plan / affirming steps taken     Current / Ongoing Stressors and Concerns:   Assertiveness and boundary setting.          Treatment Objective(s) Addressed in This Session:     Identification and  "challenge of anxious thinking     Intervention:   Discussed alternative explanations today in session.  Identified alternative explanations as primary method of challenging unhelpful thinking.  Provided handout of alternative explanations in session.  Reviewed concept of challenging cognitive distortions by recognizing strong emotions and asking challenging questions like \"is my reaction logical?\" , \"is my reaction helpful?\" , \"am I overreacting?\" , and \"what else could be going on that I am not considering?\"      Assessments completed prior to visit:  The following assessments were completed by patient for this visit:  PHQ9:       3/19/2024     5:17 PM 3/25/2024     8:01 AM   PHQ-9 SCORE   PHQ-9 Total Score MyChart  6 (Mild depression)   PHQ-9 Total Score 7 6     GAD7:       3/19/2024     5:17 PM   QIAN-7 SCORE   Total Score 4     PROMIS 10-Global Health (all questions and answers displayed):       3/25/2024     8:14 AM   PROMIS 10   In general, would you say your health is: Fair   In general, would you say your quality of life is: Good   In general, how would you rate your physical health? Fair   In general, how would you rate your mental health, including your mood and your ability to think? Good   In general, how would you rate your satisfaction with your social activities and relationships? Good   In general, please rate how well you carry out your usual social activities and roles Very good   To what extent are you able to carry out your everyday physical activities such as walking, climbing stairs, carrying groceries, or moving a chair? Completely   In the past 7 days, how often have you been bothered by emotional problems such as feeling anxious, depressed, or irritable? Sometimes   In the past 7 days, how would you rate your fatigue on average? Moderate   In the past 7 days, how would you rate your pain on average, where 0 means no pain, and 10 means worst imaginable pain? 3   In general, would you say your " health is: 2   In general, would you say your quality of life is: 3   In general, how would you rate your physical health? 2   In general, how would you rate your mental health, including your mood and your ability to think? 3   In general, how would you rate your satisfaction with your social activities and relationships? 3   In general, please rate how well you carry out your usual social activities and roles. (This includes activities at home, at work and in your community, and responsibilities as a parent, child, spouse, employee, friend, etc.) 4   To what extent are you able to carry out your everyday physical activities such as walking, climbing stairs, carrying groceries, or moving a chair? 5   In the past 7 days, how often have you been bothered by emotional problems such as feeling anxious, depressed, or irritable? 3   In the past 7 days, how would you rate your fatigue on average? 3   In the past 7 days, how would you rate your pain on average, where 0 means no pain, and 10 means worst imaginable pain? 3   Global Mental Health Score 12   Global Physical Health Score 14   PROMIS TOTAL - SUBSCORES 26         ASSESSMENT: Current Emotional / Mental Status (status of significant symptoms):   Risk status (Self / Other harm or suicidal ideation)   Patient denies current fears or concerns for personal safety.   Patient denies current or recent suicidal ideation or behaviors.   Patient denies current or recent homicidal ideation or behaviors.   Patient denies current or recent self injurious behavior or ideation.   Patient denies other safety concerns.   Patient reports there has been no change in risk factors since their last session.     Patient reports there has been no change in protective factors since their last session.     Recommended that patient call 911 or go to the local ED should there be a change in any of these risk factors.     Appearance:   Appropriate    Eye Contact:   Good    Psychomotor  Behavior: Normal    Attitude:   Cooperative    Orientation:   All   Speech    Rate / Production: Normal     Volume:  Normal    Mood:    Normal   Affect:    Appropriate    Thought Content:  Clear    Thought Form:  Coherent  Logical    Insight:    Good      Medication Review:   No changes to current psychiatric medication(s)     Medication Compliance:   Yes     Changes in Health Issues:   None reported     Chemical Use Review:   Substance Use: Chemical use reviewed, no active concerns identified      Tobacco Use: No current tobacco use.      Diagnosis:  1. QIAN (generalized anxiety disorder)            Collateral Reports Completed:   Not Applicable    PLAN: (Patient Tasks / Therapist Tasks / Other)  Meet in two weeks, client to begin building awareness of cues, challenge of thinking.        Freida Patino [facility]                                                         ______________________________________________________________________          JULIÁN Eagle  April 25, 2024               Individual Treatment Plan    Patient's Name: Nghia Gray  YOB: 1995    Date of Creation: 4/11/2024  Date Treatment Plan Last Reviewed/Revised: 4/11/2024    DSM5 Diagnoses: 300.02 (F41.1) Generalized Anxiety Disorder  Psychosocial / Contextual Factors: lives in own home/apt with significant other, employed fulltime  PROMIS (reviewed every 90 days): 3/25/24    Referral / Collaboration:  Referral to another professional/service is not indicated at this time.    Anticipated number of session for this episode of care: 9-12 sessions  Anticipation frequency of session: Every other week  Anticipated Duration of each session: 38-52 minutes  Treatment plan will be reviewed in 90 days or when goals have been changed.       MeasurableTreatment Goal(s) related to diagnosis / functional impairment(s)  Goal 1: Patient will decrease anxiety.    I will know I've met my goal when I feel no anxiety or stress about what my  family thinks of me.      Objective #A (Patient Action)    Patient will compile a list of boundaries that they would like to set with others. Family and mother .  Status: New - Date: 2024      Intervention(s)  Therapist will teach about healthy boundaries.   .    Objective #B  Patient will learn & utilize at least 1 assertive communication skills weekly.  Status: New - Date: 2024      Intervention(s)  Therapist will teach assertiveness skills.   .    Objective #C  Patient will Identify negative self-talk and behaviors: challenge core beliefs, myths, and actions.  Status: New - Date: 2024      Intervention(s)  Therapist will provide psychoeducation, behavioral activation, and cognitive restructuring..    Objective #B  Patient will  be able to self-validate at times he feels he has made a mistake of some kind .    Status: New - Date: 2024      Intervention(s)  Therapist will provide psychoeducation, behavioral activation, and cognitive restructuring..    Patient has reviewed and agreed to the above plan.      Freida Patino [facility]  2024    [facility]      PATIENT'S NAME: Nghia Gray  PREFERRED NAME: Nghia  PRONOUNS:      he him his  MRN: [id number]  : 1995  ADDRESS: [address] [place] [address] ACCT. NUMBER:  [id number]  DATE OF SERVICE: 24  START TIME: 730  END TIME: 815  PREFERRED PHONE: [phone]  May we leave a program related message: Yes  EMERGENCY CONTACT: was obtained Partner Yanique AlcalaBaode-[phone] .  SERVICE MODALITY:  Video Visit:      Provider verified identity through the following two step process.  Patient provided:  Patient  and Patient address    Telemedicine Visit: The patient's condition can be safely assessed and treated via synchronous audio and visual telemedicine encounter.      Reason for Telemedicine Visit: Patient has requested telehealth visit    Originating Site (Patient Location): Patient's home    Distant  "Site (Provider Location): Provider Remote Setting- Home Office    Consent:  The patient/guardian has verbally consented to: the potential risks and benefits of telemedicine (video visit) versus in person care; bill my insurance or make self-payment for services provided; and responsibility for payment of non-covered services.     Patient would like the video invitation sent by:  My Chart    Mode of Communication:  Video Conference via Amwell    Distant Location (Provider):  Off-site    As the provider I attest to compliance with applicable laws and regulations related to telemedicine.    UNIVERSAL ADULT Mental Health DIAGNOSTIC ASSESSMENT    Identifying Information:  Patient is a 28 year old,   individual.  Patient was referred for an assessment by referring provider.  Patient attended the session .    Chief Complaint:   The reason for seeking services at this time is: \"Family emotional trauma coupled with long-term relationship and working through the new issues it presents\".  The problem(s) began 03/25/24.    Patient has attempted to resolve these concerns in the past through therapy-has had three therapists, did not feel they were great fits for him .    Social/Family History:  Patient reported they grew up in other [place], [place].  They were raised by biological parents  .  Parents were always together.  Patient reported that their childhood was good as far as he can recall.  Mother stayed home with the children.  Father worked full time, didn't see him from morning till night.  Tenuous relationships with siblings, grew out of that as they matured.  His mother was a very anxious person who didn't take care of her anxiety or go to therapy for this.  This affected pt and his sister in different ways.  Patient described their current relationships with family of origin as decent overall.     The patient describes their cultural background as .  Cultural influences and impact on patient's life " structure, values, norms, and healthcare: Parents raised us Scientologist, I  from the [employer] as soon as I went to college.  Contextual influences on patient's health include: Contextual Factors: Individual Factors symptoms of anxiety, boundary concerns and relationship navigation .    These factors will be addressed in the Preliminary Treatment plan. Patient identified their preferred language to be English. Patient reported they does not need the assistance of an  or other support involved in therapy.     Patient reported had no significant delays in developmental tasks.   Patient's highest education level was college graduate  .  Patient identified the following learning problems: none reported.  Modifications will not be used to assist communication in therapy.  Patient reports they are  able to understand written materials.    Patient's current relationship status is has a partner or significant other.   Patient identified their sexual orientation as heterosexual.  Patient reported having   0 child(pérez). Patient identified partner as part of their support system.  Patient identified the quality of these relationships as inconsistent,  .      Patient's current living/housing situation involves staying with someone.  The immediate members of family and household include Yanique Christian,Partner and they report that housing is stable.    Patient is currently employed fulltime.  Patient reports their finances are obtained through employment; parents. Patient does identify finances as a current stressor.      Patient reported that they have not been involved with the legal system.  . Patient does not report being under probation/ parole/ jurisdiction. They are not under any current court jurisdiction. .    Patient's Strengths and Limitations:  Patient identified the following strengths or resources that will help them succeed in treatment:  stable relationship who is supportive, good friend support . Things  that may interfere with the patient's success in treatment include:  inability to unpack the familial concerns .     Assessments:  The following assessments were completed by patient for this visit:  PHQ9:       3/19/2024     5:17 PM 3/25/2024     8:01 AM   PHQ-9 SCORE   PHQ-9 Total Score MyChart  6 (Mild depression)   PHQ-9 Total Score 7 6     GAD7:       3/19/2024     5:17 PM   QIAN-7 SCORE   Total Score 4     CAGE-AID:       3/25/2024     8:15 AM   CAGE-AID Total Score   Total Score 0   Total Score MyChart 0 (A total score of 2 or greater is considered clinically significant)     PROMIS 10-Global Health (all questions and answers displayed):       3/25/2024     8:14 AM   PROMIS 10   In general, would you say your health is: Fair   In general, would you say your quality of life is: Good   In general, how would you rate your physical health? Fair   In general, how would you rate your mental health, including your mood and your ability to think? Good   In general, how would you rate your satisfaction with your social activities and relationships? Good   In general, please rate how well you carry out your usual social activities and roles Very good   To what extent are you able to carry out your everyday physical activities such as walking, climbing stairs, carrying groceries, or moving a chair? Completely   In the past 7 days, how often have you been bothered by emotional problems such as feeling anxious, depressed, or irritable? Sometimes   In the past 7 days, how would you rate your fatigue on average? Moderate   In the past 7 days, how would you rate your pain on average, where 0 means no pain, and 10 means worst imaginable pain? 3   In general, would you say your health is: 2   In general, would you say your quality of life is: 3   In general, how would you rate your physical health? 2   In general, how would you rate your mental health, including your mood and your ability to think? 3   In general, how would you  rate your satisfaction with your social activities and relationships? 3   In general, please rate how well you carry out your usual social activities and roles. (This includes activities at home, at work and in your community, and responsibilities as a parent, child, spouse, employee, friend, etc.) 4   To what extent are you able to carry out your everyday physical activities such as walking, climbing stairs, carrying groceries, or moving a chair? 5   In the past 7 days, how often have you been bothered by emotional problems such as feeling anxious, depressed, or irritable? 3   In the past 7 days, how would you rate your fatigue on average? 3   In the past 7 days, how would you rate your pain on average, where 0 means no pain, and 10 means worst imaginable pain? 3   Global Mental Health Score 12   Global Physical Health Score 14   PROMIS TOTAL - SUBSCORES 26     [place] Suicide Severity Rating Scale (Lifetime/Recent)      3/25/2024     8:00 AM   [place] Suicide Severity Rating (Lifetime/Recent)   Q1 Wish to be Dead (Lifetime) N   Q2 Non-Specific Active Suicidal Thoughts (Lifetime) Y   Non-Specific Active Suicidal Thought Description (Lifetime) [phone] in high school-friend issues at school.   2. Non-Specific Active Suicidal Thoughts (Past 1 Month) N   3. Active Suicidal Ideation with any Methods (Not Plan) Without Intent to Act (Lifetime) N   Q4 Active Suicidal Ideation with Some Intent to Act, Without Specific Plan (Lifetime) N   Q5 Active Suicidal Ideation with Specific Plan and Intent (Lifetime) N   Actual Attempt (Lifetime) N   Has subject engaged in non-suicidal self-injurious behavior? (Lifetime) N   Interrupted Attempts (Lifetime) N   Aborted or Self-Interrupted Attempt (Lifetime) N   Preparatory Acts or Behavior (Lifetime) N   Calculated C-SSRS Risk Score (Lifetime/Recent) No Risk Indicated       Personal and Family Medical History:  Patient does report a family history of mental health concerns.   Patient reports family history includes Anxiety Disorder in his maternal grandmother, mother, and sister; Colon Cancer in his father; Obesity in his paternal grandmother..     Patient does report Mental Health Diagnosis and/or Treatment.  Patient reported the following previous diagnoses which include(s): an anxiety disorder .  Patient reported symptoms began in high school as a Rick.  Patient has received mental health services in the past:  therapy  .  Psychiatric Hospitalizations: none .  Patient denies a history of civil commitment.      Currently, patient none  is receiving other mental health services.  These include none.       Patient has had a physical exam to rule out medical causes for current symptoms.  Date of last physical exam was within the past year. Client was encouraged to follow up with PCP if symptoms were to develop. The patient has a [place] [facility] Provider, who is named matt Wheatley Patient reports no current medical and/or dental concerns.  Patient denies any issues with pain..   There are not significant appetite / nutritional concerns / weight changes.   Patient does report a history of head injury / trauma / cognitive impairment. In 7th grade large picture frame fell on him had stitches, and concussion like symptoms.     Patient reports current meds as:   Current Outpatient Medications   Medication Sig Dispense Refill    FLUoxetine (PROZAC) 20 MG capsule Take 1 capsule (20 mg) by mouth daily 90 capsule 2     No current facility-administered medications for this visit.       Medication Adherence:  Patient reports taking.  taking prescribed medications as prescribed.    Patient Allergies:    Allergies   Allergen Reactions    Seasonal Allergies        Medical History:    Past Medical History:   Diagnosis Date    Depressive disorder 2013         Current Mental Status Exam:   Appearance:  Appropriate    Eye Contact:  Good   Psychomotor:  Normal       Gait / station:  Not  assessed  Attitude / Demeanor: Cooperative  Pleasant  Speech      Rate / Production: Normal/ Responsive      Volume:  Normal  volume      Language:  no problems  Mood:   Normal  Affect:   Appropriate    Thought Content: Clear   Thought Process: Coherent  Logical       Associations: No loosening of associations  Insight:   Good   Judgment:  Intact   Orientation:  All  Attention/concentration: Good    Substance Use:   Patient did report a family history of substance use concerns on mothers side-alcohol. Patient has not received chemical dependency treatment in the past.  Patient has not ever been to detox.      Patient is not currently receiving any chemical dependency treatment.           Substance History of use Age of first use Date of last use     Pattern and duration of use (include amounts and frequency)   Alcohol currently use   19 03/24/24 Reports a dink or two a week, socially only.   Cannabis   used in the past 22 04/22/18 REPORTS SUBSTANCE USE: N/A     Amphetamines   never used     REPORTS SUBSTANCE USE: N/A   Cocaine/crack    never used       REPORTS SUBSTANCE USE: N/A   Hallucinogens never used         REPORTS SUBSTANCE USE: N/A   Inhalants never used         REPORTS SUBSTANCE USE: N/A   Heroin never used         REPORTS SUBSTANCE USE: N/A   Other Opiates never used     REPORTS SUBSTANCE USE: N/A   Benzodiazepine   never used     REPORTS SUBSTANCE USE: N/A   Barbiturates never used     REPORTS SUBSTANCE USE: N/A   Over the counter meds never used     REPORTS SUBSTANCE USE: N/A   Caffeine currently use 9   One or two cups of coffee a week.   Nicotine  never used     REPORTS SUBSTANCE USE: N/A   Other substances not listed above:  Identify:  never used     REPORTS SUBSTANCE USE: N/A     Patient reported the following problems as a result of their substance use: no problems, not applicable.    Substance Use: No symptoms    Based on the negative CAGE score and clinical interview there  are not indications of  drug or alcohol abuse.    Significant Losses / Trauma / Abuse / Neglect Issues:   Patient did not  serve in the .  There are indications or report of significant loss, trauma, abuse or neglect issues related to:  emotional trauma from family of origin .  Concerns for possible neglect are not present.     Safety Assessment:   Patient denies current homicidal ideation and behaviors.  Patient denies current self-injurious ideation and behaviors.    Patient denied risk behaviors associated with substance use.   Patient denies any high risk behaviors associated with mental health symptoms.  Patient reports the following current concerns for their personal safety: None.  Patient reports there are not firearms in the house.       There are no firearms in the home..    History of Safety Concerns:  Patient denied a history of homicidal ideation.     Patient denied a history of personal safety concerns.    Patient denied a history of assaultive behaviors.    Patient denied a history of sexual assault behaviors.     Patient denied a history of risk behaviors associated with substance use.  Patient denies any history of high risk behaviors associated with mental health symptoms.  Patient reports the following protective factors: forward or future oriented thinking; dedication to family or friends; safe and stable environment; regular physical activity; sense of belonging; secure attachment; adherence with prescribed medication; living with other people; commitment to well being    Risk Plan:  See Recommendations for Safety and Risk Management Plan    Review of Symptoms per patient report:   Depression: Change in sleep, Difficulties concentrating, and Low self-worth  Analilia:  No Symptoms  Psychosis: No Symptoms  Anxiety: Excessive worry, Nervousness, Sleep disturbance, Ruminations, and Poor concentration  Panic:  No symptoms  Post Traumatic Stress Disorder:  No Symptoms   Eating Disorder: No Symptoms  ADD / ADHD:  No  symptoms  Conduct Disorder: No symptoms  Autism Spectrum Disorder: No symptoms  Obsessive Compulsive Disorder: No Symptoms    Patient reports the following compulsive behaviors and treatment history:  NA .      Diagnostic Criteria:   Generalized Anxiety Disorder  A. Excessive anxiety and worry about a number of events or activities (such as work or school performance).   B. The person finds it difficult to control the worry.  C. Select 3 or more symptoms (required for diagnosis). Only one item is required in children.   - Being easily fatigued.    - Difficulty concentrating or mind going blank.    - Sleep disturbance (difficulty falling or staying asleep, or restless unsatisfying sleep).   D. The focus of the anxiety and worry is not confined to features of an Axis I disorder.  E. The anxiety, worry, or physical symptoms cause clinically significant distress or impairment in social, occupational, or other important areas of functioning.   F. The disturbance is not due to the direct physiological effects of a substance (e.g., a drug of abuse, a medication) or a general medical condition (e.g., hyperthyroidism) and does not occur exclusively during a Mood Disorder, a Psychotic Disorder, or a Pervasive Developmental Disorder.    - The aformentioned symptoms began several year(s) ago and occurs 7 days per week and is experienced as mild.    Functional Status:  Patient reports the following functional impairments:  relationship(s).     Nonprogrammatic care:  Patient is requesting basic services to address current mental health concerns.    Clinical Summary:  1. Psychosocial, Cultural and Contextual Factors: lives with partner  .  2. Principal DSM5 Diagnoses  (Sustained by DSM5 Criteria Listed Above):   300.02 (F41.1) Generalized Anxiety Disorder.  3. Other Diagnoses that is relevant to services:   NA.  4. Provisional Diagnosis:  NA.  5. Prognosis: Expect Improvement and Maintain Current Status / Prevent  Deterioration.  6. Likely consequences of symptoms if not treated: worsening symptoms, increase in functional impairment.  7. Client strengths include:  educated, employed, goal-focused, good listener, has a previous history of therapy, insightful, intelligent, motivated, open to learning, open to suggestions / feedback, support of family, friends and providers, supportive, wants to learn, and willing to ask questions .     Recommendations:     1. Plan for Safety and Risk Management:   Safety and Risk: Recommended that patient call 911 or go to the local ED should there be a change in any of these risk factors..          Report to child / adult protection services was NA.     2. Patient's identified  no health or cultural concerns .     3. Initial Treatment will focus on:    Anxiety - QIAN  Functional Impairment at: relationships .     4. Resources/Service Plan:    services are not indicated.   Modifications to assist communication are not indicated.   Additional disability accommodations are not indicated.      5. Collaboration:   Collaboration / coordination of treatment will be initiated with the following  support professionals: primary care physician.      6.  Referrals:   The following referral(s) will be initiated:  NA .       A Release of Information has been obtained for the following:  NA .     Clinical Substantiation/medical necessity for the above recommendations:  individual therapy is necessary in order to alleviate current symptoms and restore relationship functioning.    7. ANTONI:    ANTONI:  Not identified.    8. Records:   These were reviewed at time of assessment.   Information in this assessment was obtained from the medical record and  provided by patient who is a good historian.    Patient will have open access to their mental health medical record.    9.   Interactive Complexity: No    10. Safety Plan:   Patient noted brief suicidal ideation without intent, or plan when he was in high  school-which was related to friend issues.  None since that time (2012).       Provider Name/ Credentials:  Fre[facility] Patino MS, [facility]  March 25, 2024

## 2024-11-27 ENCOUNTER — FCC EXTENDED DOCUMENTATION (OUTPATIENT)
Dept: PSYCHOLOGY | Facility: CLINIC | Age: 29
End: 2024-11-27
Payer: COMMERCIAL

## 2024-11-27 NOTE — PROGRESS NOTES
"                    Discharge Summary  Multiple Sessions    Client Name: Nghia Gray MRN#: [id number] YOB: 1995      Intake / Discharge Date: 3/25/24-11/27/24      DSM5 Diagnoses: (Sustained by DSM5 Criteria Listed Above)  Diagnoses: 300.02 (F41.1) Generalized Anxiety Disorder  Psychosocial & Contextual Factors: lives with partner, employed, no children, stable home  WHODAS 2.0 (12 item) Score: NA          Presenting Concern:  \"Family emotional trauma coupled with long-term relationship and working through the new issues it presents\"       Reason for Discharge:  Client did not return      Disposition at Time of Last Encounter:   Comments:   NA-ct did not rtn     Risk Management:   Client has had a history of suicidal ideation: many years ago related to friend issues in school.  None since  Recommended that patient call 911 or go to the local ED should there be a change in any of these risk factors      Referred To:  NA-ct did not rtn        JULIÁN Eagle   11/27/2024    "

## 2025-02-15 ENCOUNTER — MYC REFILL (OUTPATIENT)
Dept: INTERNAL MEDICINE | Facility: CLINIC | Age: 30
End: 2025-02-15
Payer: COMMERCIAL

## 2025-02-15 DIAGNOSIS — F41.1 GAD (GENERALIZED ANXIETY DISORDER): ICD-10-CM

## 2025-02-17 ASSESSMENT — ANXIETY QUESTIONNAIRES
GAD7 TOTAL SCORE: 5
GAD7 TOTAL SCORE: 5
1. FEELING NERVOUS, ANXIOUS, OR ON EDGE: SEVERAL DAYS
2. NOT BEING ABLE TO STOP OR CONTROL WORRYING: SEVERAL DAYS
IF YOU CHECKED OFF ANY PROBLEMS ON THIS QUESTIONNAIRE, HOW DIFFICULT HAVE THESE PROBLEMS MADE IT FOR YOU TO DO YOUR WORK, TAKE CARE OF THINGS AT HOME, OR GET ALONG WITH OTHER PEOPLE: SOMEWHAT DIFFICULT
3. WORRYING TOO MUCH ABOUT DIFFERENT THINGS: SEVERAL DAYS
8. IF YOU CHECKED OFF ANY PROBLEMS, HOW DIFFICULT HAVE THESE MADE IT FOR YOU TO DO YOUR WORK, TAKE CARE OF THINGS AT HOME, OR GET ALONG WITH OTHER PEOPLE?: SOMEWHAT DIFFICULT
6. BECOMING EASILY ANNOYED OR IRRITABLE: NOT AT ALL
7. FEELING AFRAID AS IF SOMETHING AWFUL MIGHT HAPPEN: NOT AT ALL
4. TROUBLE RELAXING: SEVERAL DAYS
5. BEING SO RESTLESS THAT IT IS HARD TO SIT STILL: SEVERAL DAYS
GAD7 TOTAL SCORE: 5
7. FEELING AFRAID AS IF SOMETHING AWFUL MIGHT HAPPEN: NOT AT ALL

## 2025-02-18 NOTE — TELEPHONE ENCOUNTER
"FLUoxetine (PROZAC) 20 MG capsule   Last Written Prescription Date:  3/19/2024  Last Fill Quantity: 90,   # refills: 2  Last Office Visit : 3/19/2024  Future Office visit:  none    Refill of FLUoxetine (PROZAC) 20 MG capsule was sent for 90 day supply and comment on prescription \"annual visit needed for refills of this medication due around 3/19/2025\"             3/19/2024     5:17 PM 2/17/2025     7:52 AM   QIAN-7 SCORE   Total Score  5 (mild anxiety)   Total Score 4 5        Patient-reported        "

## 2025-03-05 SDOH — HEALTH STABILITY: PHYSICAL HEALTH: ON AVERAGE, HOW MANY DAYS PER WEEK DO YOU ENGAGE IN MODERATE TO STRENUOUS EXERCISE (LIKE A BRISK WALK)?: 2 DAYS

## 2025-03-05 SDOH — HEALTH STABILITY: PHYSICAL HEALTH: ON AVERAGE, HOW MANY MINUTES DO YOU ENGAGE IN EXERCISE AT THIS LEVEL?: 30 MIN

## 2025-03-05 ASSESSMENT — SOCIAL DETERMINANTS OF HEALTH (SDOH): HOW OFTEN DO YOU GET TOGETHER WITH FRIENDS OR RELATIVES?: ONCE A WEEK

## 2025-03-10 ENCOUNTER — OFFICE VISIT (OUTPATIENT)
Dept: FAMILY MEDICINE | Facility: CLINIC | Age: 30
End: 2025-03-10
Payer: COMMERCIAL

## 2025-03-10 VITALS
WEIGHT: 270 LBS | RESPIRATION RATE: 18 BRPM | HEART RATE: 70 BPM | SYSTOLIC BLOOD PRESSURE: 116 MMHG | HEIGHT: 71 IN | OXYGEN SATURATION: 96 % | DIASTOLIC BLOOD PRESSURE: 66 MMHG | TEMPERATURE: 98.2 F | BODY MASS INDEX: 37.8 KG/M2

## 2025-03-10 DIAGNOSIS — Z80.0 FAMILY HISTORY OF COLON CANCER: ICD-10-CM

## 2025-03-10 DIAGNOSIS — F41.1 GAD (GENERALIZED ANXIETY DISORDER): ICD-10-CM

## 2025-03-10 DIAGNOSIS — Z00.00 ANNUAL PHYSICAL EXAM: Primary | ICD-10-CM

## 2025-03-10 DIAGNOSIS — L98.9 SKIN LESION: ICD-10-CM

## 2025-03-10 PROCEDURE — 3078F DIAST BP <80 MM HG: CPT | Performed by: PHYSICIAN ASSISTANT

## 2025-03-10 PROCEDURE — 3074F SYST BP LT 130 MM HG: CPT | Performed by: PHYSICIAN ASSISTANT

## 2025-03-10 PROCEDURE — 99385 PREV VISIT NEW AGE 18-39: CPT | Performed by: PHYSICIAN ASSISTANT

## 2025-03-10 NOTE — PROGRESS NOTES
SUBJECTIVE    Nghia Gray is a 29 year old male who presents for an annual exam.    Other concerns today:  1.  Skin lesion  He has a small raised lesion on his left anterior chest near the shoulder.  He has had this lesion for very long time is not changing.  He is wondering if he needs to see dermatology for it.  No personal or family history of skin cancer.    Patient Active Problem List    Diagnosis Date Noted    QIAN (generalized anxiety disorder) 02/22/2020     Priority: Medium        Immunization History   Administered Date(s) Administered    COVID-19 12+ (MODERNA) 11/14/2023, 10/14/2024    DTAP (<7y) 1995, 01/21/1997, 04/24/1997, 12/02/1997, 08/13/2001    HEPA 09/28/2007, 09/26/2008    HIB(PRP-OMP)(PedvaxHIB) 1995, 01/21/1997, 04/24/1997, 12/02/1997    HPV Quadrivalent 07/23/2012, 09/29/2012, 02/18/2013    Hepatitis B, Adult [birth year], 06/25/1997    Influenza (IIV3) PF 12/29/2009, 10/16/2010, 09/18/2011    Influenza (prior to 2024) 10/31/2015    Influenza Vaccine >6 months,quad, PF 10/25/2018    Influenza, Split Virus, Trivalent, Pf (Fluzone\Fluarix) 10/14/2024    Influenza,INJ,MDCK,PF,Quad >6mo(Flucelvax) 11/14/2023    MMR 09/03/1997, 08/13/2001    Meningococcal ACWY (Menactra ) 09/28/2007, 07/26/2013    Poliovirus, inactivated (IPV) 1996, 01/21/1997, 04/24/1997, 08/13/2001    TDAP (Adacel,Boostrix) 06/09/2006, 07/26/2013    Varicella 09/03/1997, 08/10/2007       Current Outpatient Medications   Medication Sig Dispense Refill    FLUoxetine (PROZAC) 20 MG capsule Take 1 capsule (20 mg) by mouth daily. *annual visit for refills due around 3/19/2025 90 capsule 0     No current facility-administered medications for this visit.       Past Medical History:   Diagnosis Date    Depressive disorder 2013       Past Surgical History:   Procedure Laterality Date    BIOPSY  Youth        Family History   Problem Relation Age of Onset    Anxiety Disorder Mother         Undiagnosed     Colon Cancer Father         Diagnosed early 2019, into remission late 2019/early 2020    Anxiety Disorder Maternal Grandmother         Undiagnosed    Obesity Paternal Grandmother     Anxiety Disorder Sister         On medication              3/5/2025   General Health   How would you rate your overall physical health? (!) FAIR   Feel stress (tense, anxious, or unable to sleep) Only a little   (!) STRESS CONCERN      3/5/2025   Nutrition   Three or more servings of calcium each day? (!) I DON'T KNOW   Diet: Regular (no restrictions)    Breakfast skipped   How many servings of fruit and vegetables per day? (!) 0-1   How many sweetened beverages each day? 0-1       Multiple values from one day are sorted in reverse-chronological order         3/5/2025   Exercise   Days per week of moderate/strenous exercise 2 days   Average minutes spent exercising at this level 30 min   (!) EXERCISE CONCERN      3/5/2025   Social Factors   Frequency of gathering with friends or relatives Once a week   Worry food won't last until get money to buy more No   Food not last or not have enough money for food? No   Do you have housing? (Housing is defined as stable permanent housing and does not include staying ouside in a car, in a tent, in an abandoned building, in an overnight shelter, or couch-surfing.) Yes   Are you worried about losing your housing? No   Lack of transportation? No   Unable to get utilities (heat,electricity)? No         3/5/2025   Dental   Dentist two times every year? Yes           Today's PHQ-2 Score:       3/10/2025    12:40 PM   PHQ-2 ( 1999 Pfizer)   Q1: Little interest or pleasure in doing things 0   Q2: Feeling down, depressed or hopeless 0   PHQ-2 Score 0    Q1: Little interest or pleasure in doing things Not at all   Q2: Feeling down, depressed or hopeless Not at all   PHQ-2 Score 0       Patient-reported           3/5/2025   Substance Use   Alcohol more than 3/day or more than 7/wk No   Do you use any other  "substances recreationally? No     Social History     Tobacco Use    Smoking status: Never    Smokeless tobacco: Never   Vaping Use    Vaping status: Never Used   Substance Use Topics    Alcohol use: Yes    Drug use: Never           3/5/2025   STI Screening   New sexual partner(s) since last STI/HIV test? No         3/5/2025   Contraception/Family Planning   Questions about contraception or family planning No        Reviewed and updated as needed this visit by Provider                  OBJECTIVE    Vitals:    03/10/25 1244   BP: 116/66   BP Location: Right arm   Patient Position: Sitting   Cuff Size: Adult Large   Pulse: 70   Resp: 18   Temp: 98.2  F (36.8  C)   TempSrc: Temporal   SpO2: 96%   Weight: 122.5 kg (270 lb)   Height: 1.803 m (5' 11\")       Body mass index is 37.66 kg/m .    Physical Exam:  General: patient is alert and oriented x 3, in no apparent distress  HEENT, Thyroid, Lymphatic, Cardiac, Pulmonary, GI, Musculoskeletal, and Neuro exams were completed today and grossly normal  Skin: Small slightly raised asymmetrical lesion present on the left anterior shoulder, mostly skin color but does have some areas of hyperpigmentation, no telangiectasias, no easy bleeding, approximately 1 cm in diameter  : deferred        ASSESSMENT and PLAN  1. Annual physical exam (Primary)  Health maintenance is discussed with patient as appropriate for age and risk factor  He has had a tetanus shot in the past 10 years.  He declines STD screening.  Screening lipids and blood sugar last year were completely normal.  Will defer testing for this until next year.    2. Skin lesion  New concern.  Referral made to dermatology for further evaluation.  - Adult Dermatology  Referral; Future    3. Family history of colon cancer - father  Father diagnosed with colon cancer in late 50s or early 60s.  We reviewed current recommendations for him are to start colon cancer screening at 40 years old.    4. QIAN (generalized anxiety " disorder)  Chronic, well-controlled.  Well-controlled fluoxetine, continue.      This dictation uses voice recognition software, which may contain typographical errors.

## 2025-03-10 NOTE — PATIENT INSTRUCTIONS
Patient Education   Preventive Care Advice   This is general advice given by our system to help you stay healthy. However, your care team may have specific advice just for you. Please talk to your care team about your preventive care needs.  Nutrition  Eat 5 or more servings of fruits and vegetables each day.  Try wheat bread, brown rice and whole grain pasta (instead of white bread, rice, and pasta).  Get enough calcium and vitamin D. Check the label on foods and aim for 100% of the RDA (recommended daily allowance).  Lifestyle  Exercise at least 150 minutes each week  (30 minutes a day, 5 days a week).  Do muscle strengthening activities 2 days a week. These help control your weight and prevent disease.  No smoking.  Wear sunscreen to prevent skin cancer.  Have a dental exam and cleaning every 6 months.  Yearly exams  See your health care team every year to talk about:  Any changes in your health.  Any medicines your care team has prescribed.  Preventive care, family planning, and ways to prevent chronic diseases.  Shots (vaccines)   HPV shots (up to age 26), if you've never had them before.  Hepatitis B shots (up to age 59), if you've never had them before.  COVID-19 shot: Get this shot when it's due.  Flu shot: Get a flu shot every year.  Tetanus shot: Get a tetanus shot every 10 years.  Pneumococcal, hepatitis A, and RSV shots: Ask your care team if you need these based on your risk.  Shingles shot (for age 50 and up)  General health tests  Diabetes screening:  Starting at age 35, Get screened for diabetes at least every 3 years.  If you are younger than age 35, ask your care team if you should be screened for diabetes.  Cholesterol test: At age 39, start having a cholesterol test every 5 years, or more often if advised.  Bone density scan (DEXA): At age 50, ask your care team if you should have this scan for osteoporosis (brittle bones).  Hepatitis C: Get tested at least once in your life.  STIs (sexually  transmitted infections)  Before age 24: Ask your care team if you should be screened for STIs.  After age 24: Get screened for STIs if you're at risk. You are at risk for STIs (including HIV) if:  You are sexually active with more than one person.  You don't use condoms every time.  You or a partner was diagnosed with a sexually transmitted infection.  If you are at risk for HIV, ask about PrEP medicine to prevent HIV.  Get tested for HIV at least once in your life, whether you are at risk for HIV or not.  Cancer screening tests  Cervical cancer screening: If you have a cervix, begin getting regular cervical cancer screening tests starting at age 21.  Breast cancer scan (mammogram): If you've ever had breasts, begin having regular mammograms starting at age 40. This is a scan to check for breast cancer.  Colon cancer screening: It is important to start screening for colon cancer at age 45.  Have a colonoscopy test every 10 years (or more often if you're at risk) Or, ask your provider about stool tests like a FIT test every year or Cologuard test every 3 years.  To learn more about your testing options, visit:   .  For help making a decision, visit:   https://bit.ly/nb43246.  Prostate cancer screening test: If you have a prostate, ask your care team if a prostate cancer screening test (PSA) at age 55 is right for you.  Lung cancer screening: If you are a current or former smoker ages 50 to 80, ask your care team if ongoing lung cancer screenings are right for you.  For informational purposes only. Not to replace the advice of your health care provider. Copyright   2023 [employer] All rights reserved. Clinically reviewed by the [facility]itions Program. [employer] [id number] - REV 01/24.

## 2025-03-10 NOTE — PROGRESS NOTES
Prior to immunization administration, verified patients identity using patient s name and date of birth. Please see Immunization Activity for additional information.     Screening Questionnaire for Adult Immunization    Are you sick today?   No   Do you have allergies to medications, food, a vaccine component or latex?   No   Have you ever had a serious reaction after receiving a vaccination?   No   Do you have a long-term health problem with heart, lung, kidney, or metabolic disease (e.g., diabetes), asthma, a blood disorder, no spleen, complement component deficiency, a cochlear implant, or a spinal fluid leak?  Are you on long-term aspirin therapy?   No   Do you have cancer, leukemia, HIV/AIDS, or any other immune system problem?   No   Do you have a parent, brother, or sister with an immune system problem?   No   In the past 3 months, have you taken medications that affect  your immune system, such as prednisone, other steroids, or anticancer drugs; drugs for the treatment of rheumatoid arthritis, Crohn s disease, or psoriasis; or have you had radiation treatments?   No   Have you had a seizure, or a brain or other nervous system problem?   No   During the past year, have you received a transfusion of blood or blood    products, or been given immune (gamma) globulin or antiviral drug?   No   For women: Are you pregnant or is there a chance you could become       pregnant during the next month?   No   Have you received any vaccinations in the past 4 weeks?   No     Immunization questionnaire answers were all negative.      Patient instructed to remain in clinic for 15 minutes afterwards, and to report any adverse reactions.     Screening performed by Daniela Das MA on 3/10/2025 at 12:47 PM.

## 2025-05-19 DIAGNOSIS — F41.1 GAD (GENERALIZED ANXIETY DISORDER): ICD-10-CM
